# Patient Record
Sex: FEMALE | Race: WHITE | NOT HISPANIC OR LATINO | Employment: FULL TIME | URBAN - METROPOLITAN AREA
[De-identification: names, ages, dates, MRNs, and addresses within clinical notes are randomized per-mention and may not be internally consistent; named-entity substitution may affect disease eponyms.]

---

## 2017-01-28 VITALS
TEMPERATURE: 98.1 F | SYSTOLIC BLOOD PRESSURE: 129 MMHG | WEIGHT: 223.33 LBS | DIASTOLIC BLOOD PRESSURE: 67 MMHG | OXYGEN SATURATION: 99 % | BODY MASS INDEX: 31.27 KG/M2 | RESPIRATION RATE: 18 BRPM | HEIGHT: 71 IN | HEART RATE: 92 BPM

## 2017-01-28 PROCEDURE — 99283 EMERGENCY DEPT VISIT LOW MDM: CPT

## 2017-01-28 PROCEDURE — 303977 HCHG I & D

## 2017-01-28 RX ORDER — AMOXICILLIN 500 MG/1
1000 CAPSULE ORAL 2 TIMES DAILY
COMMUNITY
End: 2019-10-20

## 2017-01-28 ASSESSMENT — PAIN SCALES - GENERAL: PAINLEVEL_OUTOF10: 9

## 2017-01-29 ENCOUNTER — HOSPITAL ENCOUNTER (EMERGENCY)
Facility: MEDICAL CENTER | Age: 32
End: 2017-01-29
Attending: EMERGENCY MEDICINE
Payer: MEDICAID

## 2017-01-29 DIAGNOSIS — N61.1 BREAST ABSCESS OF FEMALE: ICD-10-CM

## 2017-01-29 PROCEDURE — A9270 NON-COVERED ITEM OR SERVICE: HCPCS | Performed by: EMERGENCY MEDICINE

## 2017-01-29 PROCEDURE — 700102 HCHG RX REV CODE 250 W/ 637 OVERRIDE(OP): Performed by: EMERGENCY MEDICINE

## 2017-01-29 PROCEDURE — 700101 HCHG RX REV CODE 250

## 2017-01-29 RX ORDER — CEPHALEXIN 500 MG/1
500 CAPSULE ORAL ONCE
Status: COMPLETED | OUTPATIENT
Start: 2017-01-29 | End: 2017-01-29

## 2017-01-29 RX ORDER — LORAZEPAM 1 MG/1
0.5 TABLET ORAL ONCE
Status: COMPLETED | OUTPATIENT
Start: 2017-01-29 | End: 2017-01-29

## 2017-01-29 RX ORDER — HYDROCODONE BITARTRATE AND ACETAMINOPHEN 5; 325 MG/1; MG/1
1-2 TABLET ORAL EVERY 6 HOURS PRN
Qty: 12 TAB | Refills: 0 | Status: SHIPPED | OUTPATIENT
Start: 2017-01-29 | End: 2019-10-20

## 2017-01-29 RX ORDER — SULFAMETHOXAZOLE AND TRIMETHOPRIM 800; 160 MG/1; MG/1
1 TABLET ORAL ONCE
Status: COMPLETED | OUTPATIENT
Start: 2017-01-29 | End: 2017-01-29

## 2017-01-29 RX ORDER — HYDROCODONE BITARTRATE AND ACETAMINOPHEN 5; 325 MG/1; MG/1
2 TABLET ORAL ONCE
Status: COMPLETED | OUTPATIENT
Start: 2017-01-29 | End: 2017-01-29

## 2017-01-29 RX ORDER — SULFAMETHOXAZOLE AND TRIMETHOPRIM 800; 160 MG/1; MG/1
1 TABLET ORAL EVERY 12 HOURS
Qty: 20 TAB | Refills: 0 | Status: SHIPPED | OUTPATIENT
Start: 2017-01-29 | End: 2017-02-08

## 2017-01-29 RX ADMIN — CEPHALEXIN 500 MG: 500 CAPSULE ORAL at 03:25

## 2017-01-29 RX ADMIN — LORAZEPAM 0.5 MG: 1 TABLET ORAL at 03:25

## 2017-01-29 RX ADMIN — SULFAMETHOXAZOLE AND TRIMETHOPRIM 1 TABLET: 800; 160 TABLET ORAL at 03:25

## 2017-01-29 RX ADMIN — LIDOCAINE HYDROCHLORIDE: 10; .005 INJECTION, SOLUTION EPIDURAL; INFILTRATION; INTRACAUDAL; PERINEURAL at 03:30

## 2017-01-29 RX ADMIN — HYDROCODONE BITARTRATE AND ACETAMINOPHEN 2 TABLET: 5; 325 TABLET ORAL at 03:25

## 2017-01-29 NOTE — ED AVS SNAPSHOT
1/29/2017          Janice Goins  [address] [place] [address]    Dear Janice:    [facility] wants to ensure your discharge home is safe and you or your loved ones have had all your questions answered regarding your care after you leave the hospital.    You may receive a telephone call within two days of your discharge.  This call is to make certain you understand your discharge instructions as well as ensure we provided you with the best care possible during your stay with us.     The call will only last approximately 3-5 minutes and will be done by a nurse.    Once again, we want to ensure your discharge home is safe and that you have a clear understanding of any next steps in your care.  If you have any questions or concerns, please do not hesitate to contact us, we are here for you.  Thank you for choosing [facility] for your healthcare needs.    Sincerely,    Shravan Francisco    [facility]

## 2017-01-29 NOTE — ED PROVIDER NOTES
"ED Provider Note    Scribed for Dave Silva M.D. by Topher Briones. 1/29/2017, 3:09 AM.    Primary care provider: Pcp Not In Computer  Means of arrival: Walk-in  History obtained from: Patient  History limited by: None    CHIEF COMPLAINT  Chief Complaint   Patient presents with   • Breast Pain   • Breast Swelling       HPI  Janice Goins is a 31 y.o. female who presents to the Emergency Department with breast pain and swelling for one week. Patient has a lump under her left nipple with redness and pain that she states developed overnight. She rates the pain as 7/10 on the pain as 7/10 on the pain scale. She stopped breastfeeding 4 months ago. Patient denies fever, chills, nausea, vomiting, or other symptoms. Patient has been taking amoxicillin for over one week.     REVIEW OF SYSTEMS  Review of Systems   Musculoskeletal:        Positive for breast pain and swelling   Skin:        Positive for redness   All other systems reviewed and are negative.      PAST MEDICAL HISTORY   Pregnancy with live birth    SURGICAL HISTORY  patient denies any surgical history    SOCIAL HISTORY  Social History   Substance Use Topics   • Smoking status: Never Smoker    • Smokeless tobacco: Never Used   • Alcohol Use: No      History   Drug Use No       FAMILY HISTORY  No contributing family history noted.     CURRENT MEDICATIONS  Home Medications     Reviewed by Efe Don R.N. (Registered Nurse) on 01/29/17 at 0251  Med List Status: Partial    Medication Last Dose Status    amoxicillin (AMOXIL) 500 MG Cap  Active                ALLERGIES  No Known Allergies    PHYSICAL EXAM  VITAL SIGNS: /67 mmHg  Pulse 92  Temp(Src) 36.7 °C (98.1 °F)  Resp 18  Ht 1.803 m (5' 10.98\")  Wt 101.3 kg (223 lb 5.2 oz)  BMI 31.16 kg/m2  SpO2 99%    Constitutional: Well developed, Well nourished, mild distress.   HENT: Normocephalic, Atraumatic  Eyes: Conjunctiva normal, No discharge.   Cardiovascular: Normal heart rate, Normal " rhythm, No murmurs, equal pulses.   Pulmonary: Normal breath sounds, No respiratory distress, No wheezing, No rales, No rhonchi.  Abdomen:Soft, No tenderness, No masses, no rebound, no guarding.   Chest, patient's left breast has a what appears to be an abscess as described below  Skin: Left breast 3:00 o'clock position just off left areola with 2 cm x 3 cm area of erythema with warmth, tenderness, and fluctuance, slightly pointing.  Neurologic: Alert & oriented x 3, Normal motor function,  No focal deficits noted.   Psychiatric: Affect normal, Judgment normal, Mood normal.     Incision and Drainage Procedure Note    Indication: Abscess    Procedure: The patient was positioned appropriately and the skin over the incision site was prepped with betadine and draped in a sterile fashion. Local anesthesia was obtained by infiltration using 2% Lidocaine with epinephrine.  An incision was then made over the apex of the lesion and approximately 3 cc of thick, foul smelling and purulent material was expressed. Loculations were not present. The drainage cavity was then irrigated, packed with sterile gauze and dressed with a sterile dressing. The patient’s tetanus status was up to date and did not require a booster dose.    The patient tolerated the procedure well.    Complications: None       COURSE & MEDICAL DECISION MAKING  Pertinent Labs & Imaging studies reviewed. (See chart for details)    3:09 AM - Patient seen and examined at bedside. Bedside ultrasound indicated an abscess. I explained to the patient I can drain the area, as antibiotics will not be as effective without draining the abscess. She agrees to this but is anxious. Patient will be treated with Ativan 0.5 mg PO, Bactrim 800-160 mg PO, Keflex 500 mg PO, Norco 5-325 mg PO    Medical Decision Making: Patient presents with what appears to be an abscess of the left breast. After discussion with the patient and given options of needle drainage versus incision and  drainage with a scalpel patient preferred the scalpel technique. Patient underwent procedure well and was placed on Bactrim to cover surrounding cellulitis. As the there are on amoxicillin as well.    I reviewed prescription monitoring program for patient's narcotic use before prescribing a scheduled drug.The patient will not drink alcohol nor drive with prescribed medications. The patient will return for new or worsening symptoms and is stable at the time of discharge.    The patient is referred to a primary physician for blood pressure management, diabetic screening, and for all other preventative health concerns.    DISPOSITION:  Patient will be discharged home in stable condition.    FOLLOW UP:  Your Physician  Varies    Schedule an appointment as soon as possible for a visit in 2 days      [place] [id number] [facility] [address]  [facility] [address]  [phone]      If you need a doctor    [address] [address]  [phone]    If you need a doctor      OUTPATIENT MEDICATIONS:  Discharge Medication List as of 1/29/2017  4:03 AM      START taking these medications    Details   sulfamethoxazole-trimethoprim (BACTRIM DS) 800-160 MG tablet Take 1 Tab by mouth every 12 hours for 10 days., Disp-20 Tab, R-0, Print Rx Paper      hydrocodone-acetaminophen (NORCO) 5-325 MG Tab per tablet Take 1-2 Tabs by mouth every 6 hours as needed., Disp-12 Tab, R-0, Print Rx Paper              FINAL IMPRESSION  1. Breast abscess of female          Topher SANDOVAL (Trey), am scribing for, and in the presence of, Dave Silva M.D.    Electronically signed by: Topher Frederick), 1/29/2017    Dave SANDOVAL M.D. personally performed the services described in this documentation, as scribed by Topher Briones in my presence, and it is both accurate and complete.    The note accurately reflects work and decisions made by me.  Dave Silva  1/29/2017  6:47 AM

## 2017-01-29 NOTE — ED NOTES
Patient to ED triage complaints of left breast pain and swelling. She as grape sized lump under left nipple, skin is discolored and redness is noted to area around lump. She states she noticed it  Week ago, she states it developed over night. Area is very tender. Rates pain 7/10. She states she stopped breast feeding approx 4 months ago. Denies fever.     Pt educated on ED process and asked to wait in lobby until appropriate bed assignment can be made. Patient educated on importance of alerting staff to new or worsening symptoms or concerns.

## 2017-01-29 NOTE — ED NOTES
Pt to be discharged following abscess incision and drainage by ERP. AVS and Rx given along with follow up instructions.

## 2017-01-29 NOTE — ED AVS SNAPSHOT
Social Point Access Code: [email or web address]  Expires: 2/28/2017  4:02 AM    Your email address is not on file at [facility].  Email Addresses are required for you to sign up for Social Point, please contact [phone] to verify your personal information and to provide your email address prior to attempting to register for Social Point.    Janice Goins  [address] [facility] [place] [address]    Social Point  A secure, online tool to manage your health information     [facility]’s Social Point® is a secure, online tool that connects you to your personalized health information from the privacy of your home -- day or night - making it very easy for you to manage your healthcare. Once the activation process is completed, you can even access your medical information using the Social Point nuria, which is available for free in the Apple Nuria store or Google Play store.     To learn more about Social Point, visit [email or web address]    There are two levels of access available (as shown below):   My Chart Features  [facility] Primary Care Doctor [facility]  Specialists [facility]  Urgent  Care Non-[facility] Primary Care Doctor   Email your healthcare team securely and privately 24/7 X X X    Manage appointments: schedule your next appointment; view details of past/upcoming appointments X      Request prescription refills. X      View recent personal medical records, including lab and immunizations X X X X   View health record, including health history, allergies, medications X X X X   Read reports about your outpatient visits, procedures, consult and ER notes X X X X   See your discharge summary, which is a recap of your hospital and/or ER visit that includes your diagnosis, lab results, and care plan X X  X     How to register for Social Point:  Once your e-mail address has been verified, follow the following steps to sign up for Social Point.     1. Go to  [email or web address]  2. Click on the Sign Up Now box, which takes you to the New Member Sign Up page. You will  need to provide the following information:  a. Enter your Naytev Access Code exactly as it appears at the top of this page. (You will not need to use this code after you’ve completed the sign-up process. If you do not sign up before the expiration date, you must request a new code.)   b. Enter your date of birth.   c. Enter your home email address.   d. Click Submit, and follow the next screen’s instructions.  3. Create a Robodromt ID. This will be your Naytev login ID and cannot be changed, so think of one that is secure and easy to remember.  4. Create a Naytev password. You can change your password at any time.  5. Enter your Password Reset Question and Answer. This can be used at a later time if you forget your password.   6. Enter your e-mail address. This allows you to receive e-mail notifications when new information is available in Naytev.  7. Click Sign Up. You can now view your health information.    For assistance activating your Naytev account, call [phone]

## 2017-01-29 NOTE — ED AVS SNAPSHOT
After Visit Summary                                                                                                                Janice Goins   MRN: [id number]    Department:  [facility], Emergency Dept   Date of Visit:  1/28/2017            [facility], Emergency Dept    [address] [address]    Phone:  [phone]      You were seen by     Dave Silva M.D.      Your Diagnosis Was     Breast abscess of female     N61.1       These are the medications you received during your hospitalization from 01/28/2017 2234 to 01/29/2017 0403     Date/Time Order Dose Route Action    01/29/2017 0325 hydrocodone-acetaminophen (NORCO) 5-325 MG per tablet 2 Tab 2 Tab Oral Given    01/29/2017 0325 lorazepam (ATIVAN) tablet 0.5 mg 0.5 mg Oral Given    01/29/2017 0325 sulfamethoxazole-trimethoprim (BACTRIM DS) 800-160 MG tablet 1 Tab 1 Tab Oral Given    01/29/2017 0325 cephALEXin (KEFLEX) capsule 500 mg 500 mg Oral Given      Follow-up Information     1. Follow up with Your Physician. Schedule an appointment as soon as possible for a visit in 2 days.    Specialty:  Emergency Medicine    Contact information    Varies          2. Follow up with [facility].    Why:  If you need a doctor    Contact information    [address]  [facility] [address] [phone]          3. Follow up with [facility].    Why:  If you need a doctor    Contact information    [address] [address] [phone]      Medication Information     Review all of your home medications and newly ordered medications with your primary doctor and/or pharmacist as soon as possible. Follow medication instructions as directed by your doctor and/or pharmacist.     Please keep your complete medication list with you and share with your physician. Update the information when medications are discontinued, doses are changed, or new medications (including over-the-counter  products) are added; and carry medication information at all times in the event of emergency situations.               Medication List      START taking these medications        Instructions    hydrocodone-acetaminophen 5-325 MG Tabs per tablet   Commonly known as:  NORCO    Take 1-2 Tabs by mouth every 6 hours as needed.   Dose:  1-2 Tab       sulfamethoxazole-trimethoprim 800-160 MG tablet   Commonly known as:  BACTRIM DS    Take 1 Tab by mouth every 12 hours for 10 days.   Dose:  1 Tab         ASK your doctor about these medications        Instructions    amoxicillin 500 MG Caps   Commonly known as:  AMOXIL    Take 1,000 mg by mouth 2 times a day.   Dose:  1000 mg                 Discharge Instructions       Return if you have increasing pain, redness, fever or significant pus. Return or see your doctor in 2 days for packing removal and recheck. Take all your antibiotics until gone. No drinking or driving on [place]. Wash daily with soap and water and keep covered.     Abscess  An abscess is an infected area that contains a collection of pus and debris. It can occur in almost any part of the body. An abscess is also known as a furuncle or boil.  CAUSES   An abscess occurs when tissue gets infected. This can occur from blockage of oil or sweat glands, infection of hair follicles, or a minor injury to the skin. As the body tries to fight the infection, pus collects in the area and creates pressure under the skin. This pressure causes pain. People with weakened immune systems have difficulty fighting infections and get certain abscesses more often.   SYMPTOMS  Usually an abscess develops on the skin and becomes a painful mass that is red, warm, and tender. If the abscess forms under the skin, you may feel a moveable soft area under the skin. Some abscesses break open (rupture) on their own, but most will continue to get worse without care. The infection can spread deeper into the body and eventually into the  bloodstream, causing you to feel ill.   DIAGNOSIS   Your caregiver will take your medical history and perform a physical exam. A sample of fluid may also be taken from the abscess to determine what is causing your infection.  TREATMENT   Your caregiver may prescribe antibiotic medicines to fight the infection. However, taking antibiotics alone usually does not cure an abscess. Your caregiver may need to make a small cut (incision) in the abscess to drain the pus. In some cases, gauze is packed into the abscess to reduce pain and to continue draining the area.  HOME CARE INSTRUCTIONS   · Only take over-the-counter or prescription medicines for pain, discomfort, or fever as directed by your caregiver.  · If you were prescribed antibiotics, take them as directed. Finish them even if you start to feel better.  · If gauze is used, follow your caregiver's directions for changing the gauze.  · To avoid spreading the infection:  ¨ Keep your draining abscess covered with a bandage.  ¨ Wash your hands well.  ¨ Do not share personal care items, towels, or whirlpools with others.  ¨ Avoid skin contact with others.  · Keep your skin and clothes clean around the abscess.  · Keep all follow-up appointments as directed by your caregiver.  SEEK MEDICAL CARE IF:   · You have increased pain, swelling, redness, fluid drainage, or bleeding.  · You have muscle aches, chills, or a general ill feeling.  · You have a fever.  MAKE SURE YOU:   · Understand these instructions.  · Will watch your condition.  · Will get help right away if you are not doing well or get worse.     This information is not intended to replace advice given to you by your health care provider. Make sure you discuss any questions you have with your health care provider.     Document Released: 09/27/2006 Document Revised: 06/18/2013 Document Reviewed: 03/01/2013  [employer] Interactive Patient Education ©2016 [employer]            Patient Information     Patient  Information    Following emergency treatment: all patient requiring follow-up care must return either to a private physician or a clinic if your condition worsens before you are able to obtain further medical attention, please return to the emergency room.     Billing Information    At [facility], we work to make the billing process streamlined for our patients.  Our Representatives are here to answer any questions you may have regarding your hospital bill.  If you have insurance coverage and have supplied your insurance information to us, we will submit a claim to your insurer on your behalf.  Should you have any questions regarding your bill, we can be reached online or by phone as follows:  Online: You are able pay your bills online or live chat with our representatives about any billing questions you may have. We are here to help Monday - Friday from 8:00am to 7:30pm and 9:00am - 12:00pm on Saturdays.  Please visit [email or web address]  for more information.   Phone:  [phone] or [phone]    Please note that your emergency physician, surgeon, pathologist, radiologist, anesthesiologist, and other specialists are not employed by [facility] and will therefore bill separately for their services.  Please contact them directly for any questions concerning their bills at the numbers below:     Emergency Physician Services:  [phone]  [facility]:  [phone]  [facility]:  [phone]  [facility]:  [phone]    1. Your final bill may vary from the amount quoted upon discharge if all procedures are not complete at that time, or if your doctor has additional procedures of which we are not aware. You will receive an additional bill if you return to the Emergency Department at [facility] for suture removal regardless of the facility of which the sutures were placed.     2. Please arrange for settlement of this account  at the emergency registration.    3. All self-pay accounts are due in full at the time of treatment.  If you are unable to meet this obligation then payment is expected within 4-5 days.     4. If you have had radiology studies (CT, X-ray, Ultrasound, MRI), you have received a preliminary result during your emergency department visit. Please contact the radiology department [phone] to receive a copy of your final result. Please discuss the Final result with your primary physician or with the follow up physician provided.     Crisis Hotline:  [place] Crisis Hotline:  9-034-UDFAXQU or 1-681.732.7266  [place] Crisis Hotline:    1-346.972.5002 or 740-530-0988         ED Discharge Follow Up Questions    1. In order to provide you with very good care, we would like to follow up with a phone call in the next few days.  May we have your permission to contact you?     YES /  NO    2. What is the best phone number to call you? (       )_____-__________    3. What is the best time to call you?      Morning  /  Afternoon  /  Evening                   Patient Signature:  ____________________________________________________________    Date:  ____________________________________________________________

## 2017-01-29 NOTE — DISCHARGE INSTRUCTIONS
Return if you have increasing pain, redness, fever or significant pus. Return or see your doctor in 2 days for packing removal and recheck. Take all your antibiotics until gone. No drinking or driving on [place]. Wash daily with soap and water and keep covered.     Abscess  An abscess is an infected area that contains a collection of pus and debris. It can occur in almost any part of the body. An abscess is also known as a furuncle or boil.  CAUSES   An abscess occurs when tissue gets infected. This can occur from blockage of oil or sweat glands, infection of hair follicles, or a minor injury to the skin. As the body tries to fight the infection, pus collects in the area and creates pressure under the skin. This pressure causes pain. People with weakened immune systems have difficulty fighting infections and get certain abscesses more often.   SYMPTOMS  Usually an abscess develops on the skin and becomes a painful mass that is red, warm, and tender. If the abscess forms under the skin, you may feel a moveable soft area under the skin. Some abscesses break open (rupture) on their own, but most will continue to get worse without care. The infection can spread deeper into the body and eventually into the bloodstream, causing you to feel ill.   DIAGNOSIS   Your caregiver will take your medical history and perform a physical exam. A sample of fluid may also be taken from the abscess to determine what is causing your infection.  TREATMENT   Your caregiver may prescribe antibiotic medicines to fight the infection. However, taking antibiotics alone usually does not cure an abscess. Your caregiver may need to make a small cut (incision) in the abscess to drain the pus. In some cases, gauze is packed into the abscess to reduce pain and to continue draining the area.  HOME CARE INSTRUCTIONS   · Only take over-the-counter or prescription medicines for pain, discomfort, or fever as directed by your caregiver.  · If you were  prescribed antibiotics, take them as directed. Finish them even if you start to feel better.  · If gauze is used, follow your caregiver's directions for changing the gauze.  · To avoid spreading the infection:  ¨ Keep your draining abscess covered with a bandage.  ¨ Wash your hands well.  ¨ Do not share personal care items, towels, or whirlpools with others.  ¨ Avoid skin contact with others.  · Keep your skin and clothes clean around the abscess.  · Keep all follow-up appointments as directed by your caregiver.  SEEK MEDICAL CARE IF:   · You have increased pain, swelling, redness, fluid drainage, or bleeding.  · You have muscle aches, chills, or a general ill feeling.  · You have a fever.  MAKE SURE YOU:   · Understand these instructions.  · Will watch your condition.  · Will get help right away if you are not doing well or get worse.     This information is not intended to replace advice given to you by your health care provider. Make sure you discuss any questions you have with your health care provider.     Document Released: 09/27/2006 Document Revised: 06/18/2013 Document Reviewed: 03/01/2013  [employer] Interactive Patient Education ©2016 [employer]

## 2017-01-29 NOTE — ED NOTES
Discharge orders received, instructions and education given, follow-up appointments discussed, pt verbalized understanding.

## 2017-02-22 ENCOUNTER — NON-PROVIDER VISIT (OUTPATIENT)
Dept: URGENT CARE | Facility: CLINIC | Age: 32
End: 2017-02-22

## 2017-02-22 DIAGNOSIS — Z02.1 PRE-EMPLOYMENT DRUG SCREENING: ICD-10-CM

## 2017-02-22 LAB
AMP AMPHETAMINE: NORMAL
COC COCAINE: NORMAL
INT CON NEG: NORMAL
INT CON POS: NORMAL
MET METHAMPHETAMINES: NORMAL
OPI OPIATES: NORMAL
PCP PHENCYCLIDINE: NORMAL
POC DRUG COMMENT 753798-OCCUPATIONAL HEALTH: NORMAL
THC: NORMAL

## 2017-02-22 PROCEDURE — 80305 DRUG TEST PRSMV DIR OPT OBS: CPT | Performed by: NURSE PRACTITIONER

## 2017-05-05 ENCOUNTER — HOSPITAL ENCOUNTER (EMERGENCY)
Dept: HOSPITAL 8 - ED | Age: 32
Discharge: HOME | End: 2017-05-05
Payer: MEDICAID

## 2017-05-05 VITALS — WEIGHT: 234.57 LBS | BODY MASS INDEX: 32.84 KG/M2 | HEIGHT: 71 IN

## 2017-05-05 VITALS — DIASTOLIC BLOOD PRESSURE: 74 MMHG | SYSTOLIC BLOOD PRESSURE: 119 MMHG

## 2017-05-05 DIAGNOSIS — H10.023: Primary | ICD-10-CM

## 2017-05-05 PROCEDURE — 99283 EMERGENCY DEPT VISIT LOW MDM: CPT

## 2019-10-20 ENCOUNTER — HOSPITAL ENCOUNTER (EMERGENCY)
Facility: MEDICAL CENTER | Age: 34
End: 2019-10-20
Attending: EMERGENCY MEDICINE
Payer: MEDICAID

## 2019-10-20 VITALS
TEMPERATURE: 98 F | BODY MASS INDEX: 41.02 KG/M2 | SYSTOLIC BLOOD PRESSURE: 114 MMHG | OXYGEN SATURATION: 99 % | HEIGHT: 71 IN | DIASTOLIC BLOOD PRESSURE: 61 MMHG | RESPIRATION RATE: 16 BRPM | WEIGHT: 293 LBS | HEART RATE: 81 BPM

## 2019-10-20 DIAGNOSIS — K02.9 PAIN DUE TO DENTAL CARIES: ICD-10-CM

## 2019-10-20 PROCEDURE — 700102 HCHG RX REV CODE 250 W/ 637 OVERRIDE(OP): Performed by: EMERGENCY MEDICINE

## 2019-10-20 PROCEDURE — 99283 EMERGENCY DEPT VISIT LOW MDM: CPT

## 2019-10-20 PROCEDURE — A9270 NON-COVERED ITEM OR SERVICE: HCPCS | Performed by: EMERGENCY MEDICINE

## 2019-10-20 RX ORDER — HYDROCODONE BITARTRATE AND ACETAMINOPHEN 5; 325 MG/1; MG/1
1 TABLET ORAL ONCE
Status: COMPLETED | OUTPATIENT
Start: 2019-10-20 | End: 2019-10-20

## 2019-10-20 RX ORDER — ACETAMINOPHEN 500 MG
500-1000 TABLET ORAL EVERY 6 HOURS PRN
Status: ON HOLD | COMMUNITY
End: 2020-06-06

## 2019-10-20 RX ORDER — IBUPROFEN 600 MG/1
600 TABLET ORAL EVERY 6 HOURS PRN
Status: ON HOLD | COMMUNITY
End: 2020-01-29

## 2019-10-20 RX ORDER — AMOXICILLIN AND CLAVULANATE POTASSIUM 875; 125 MG/1; MG/1
1 TABLET, FILM COATED ORAL ONCE
Status: COMPLETED | OUTPATIENT
Start: 2019-10-20 | End: 2019-10-20

## 2019-10-20 RX ORDER — AMOXICILLIN AND CLAVULANATE POTASSIUM 875; 125 MG/1; MG/1
1 TABLET, FILM COATED ORAL 2 TIMES DAILY
Qty: 20 TAB | Refills: 0 | Status: SHIPPED | OUTPATIENT
Start: 2019-10-20 | End: 2019-10-30

## 2019-10-20 RX ORDER — HYDROCODONE BITARTRATE AND ACETAMINOPHEN 5; 325 MG/1; MG/1
1 TABLET ORAL EVERY 6 HOURS PRN
Qty: 12 TAB | Refills: 0 | Status: SHIPPED | OUTPATIENT
Start: 2019-10-20 | End: 2019-10-23

## 2019-10-20 RX ADMIN — HYDROCODONE BITARTRATE AND ACETAMINOPHEN 1 TABLET: 5; 325 TABLET ORAL at 20:44

## 2019-10-20 RX ADMIN — AMOXICILLIN AND CLAVULANATE POTASSIUM 1 TABLET: 875; 125 TABLET, FILM COATED ORAL at 20:44

## 2019-10-20 ASSESSMENT — LIFESTYLE VARIABLES: DO YOU DRINK ALCOHOL: NO

## 2019-10-21 NOTE — DISCHARGE INSTRUCTIONS
Dental Care and Dentist Visits  Dental care supports good overall health. Regular dental visits can also help you avoid dental pain, bleeding, infection, and other more serious health problems in the future. It is important to keep the mouth healthy because diseases in the teeth, gums, and other oral tissues can spread to other areas of the body. Some problems, such as diabetes, heart disease, and pre-term labor have been associated with poor oral health.   See your dentist every 6 months. If you experience emergency problems such as a toothache or broken tooth, go to the dentist right away. If you see your dentist regularly, you may catch problems early. It is easier to be treated for problems in the early stages.   WHAT TO EXPECT AT A DENTIST VISIT   Your dentist will look for many common oral health problems and recommend proper treatment. At your regular dental visit, you can expect:  · Gentle cleaning of the teeth and gums. This includes scraping and polishing. This helps to remove the sticky substance around the teeth and gums (plaque). Plaque forms in the mouth shortly after eating. Over time, plaque hardens on the teeth as tartar. If tartar is not removed regularly, it can cause problems. Cleaning also helps remove stains.  · Periodic X-rays. These pictures of the teeth and supporting bone will help your dentist assess the health of your teeth.  · Periodic fluoride treatments. Fluoride is a natural mineral shown to help strengthen teeth. Fluoride treatment involves applying a fluoride gel or varnish to the teeth. It is most commonly done in children.  · Examination of the mouth, tongue, jaws, teeth, and gums to look for any oral health problems, such as:  ¨ Cavities (dental caries). This is decay on the tooth caused by plaque, sugar, and acid in the mouth. It is best to catch a cavity when it is small.  ¨ Inflammation of the gums caused by plaque buildup (gingivitis).  ¨ Problems with the mouth or  malformed or misaligned teeth.  ¨ Oral cancer or other diseases of the soft tissues or jaws.   KEEP YOUR TEETH AND GUMS HEALTHY  For healthy teeth and gums, follow these general guidelines as well as your dentist's specific advice:  · Have your teeth professionally cleaned at the dentist every 6 months.  · Brush twice daily with a fluoride toothpaste.  · Floss your teeth daily.   · Ask your dentist if you need fluoride supplements, treatments, or fluoride toothpaste.  · Eat a healthy diet. Reduce foods and drinks with added sugar.  · Avoid smoking.  TREATMENT FOR ORAL HEALTH PROBLEMS  If you have oral health problems, treatment varies depending on the conditions present in your teeth and gums.  · Your caregiver will most likely recommend good oral hygiene at each visit.  · For cavities, gingivitis, or other oral health disease, your caregiver will perform a procedure to treat the problem. This is typically done at a separate appointment. Sometimes your caregiver will refer you to another dental specialist for specific tooth problems or for surgery.  SEEK IMMEDIATE DENTAL CARE IF:  · You have pain, bleeding, or soreness in the gum, tooth, jaw, or mouth area.  · A permanent tooth becomes loose or  from the gum socket.  · You experience a blow or injury to the mouth or jaw area.     This information is not intended to replace advice given to you by your health care provider. Make sure you discuss any questions you have with your health care provider.     Document Released: 08/29/2012 Document Revised: 03/11/2013 Document Reviewed: 08/29/2012  [employer] Interactive Patient Education ©2016 [employer]  Follow-up at the [facility] dental office as soon as possible.  The emergency department will not provide refills of controlled substances.

## 2019-10-21 NOTE — ED NOTES
D/c papers given, pt verbalized understanding. Resp even ul, skin warm and dry, AAOx4, no acute distress noted.  Prescriptions given, narcotic form signed, verbalized understanding.  Pt ambulates with steady gait to ED exit, no acute distress noted.

## 2019-10-21 NOTE — ED TRIAGE NOTES
Janice Goins  34 y.o.  Chief Complaint   Patient presents with   • Dental Injury     R upper and lower molars chipped about 10 days ago   • Dental Pain     rates pain 10/10, unrelieved wiht Tylenol and Ibuprofen taken at home   • Pregnancy     5 weeks, LMP 9/14/19     Ambulatory to triage with steady gait for above.    Recently moved back to [place] - is not yet established with a PCP or a dentist.    Airway intact. No stridor noted. Managing secretions and speaking in full sentences without difficulty.    Triage process explained to patient, apologized for wait time, and returned to lobby.

## 2019-10-21 NOTE — ED PROVIDER NOTES
ED Provider Note    CHIEF COMPLAINT  Chief Complaint   Patient presents with   • Dental Injury     R upper and lower molars chipped about 10 days ago   • Dental Pain     rates pain 10/10, unrelieved wiht Tylenol and Ibuprofen taken at home   • Pregnancy     5 weeks, LMP 9/14/19       HPI  Janice Goins is a 34 y.o. female who presents to the emergency department complaining of a week and a half of dental pain.  The patient chipped a carious tooth in the right lower dentition and also has pain in the molar on the right upper dentition.  She has not yet seen a dentist stating that she does not have insurance or money to see a dentist but she just learned that she could go to the [facility] dental clinic.  The patient is currently 5 weeks pregnant she also has not yet had any prenatal care but plans to go to the pregnancy center.  The patient says that she typically does not take pain pills but Tylenol and Motrin are not controlling her pain so she is come in today.    REVIEW OF SYSTEMS no fever or chills    PAST MEDICAL HISTORY  History reviewed. No pertinent past medical history.    FAMILY HISTORY  History reviewed. No pertinent family history.    SOCIAL HISTORY  Social History     Socioeconomic History   • Marital status: Single     Spouse name: Not on file   • Number of children: Not on file   • Years of education: Not on file   • Highest education level: Not on file   Occupational History   • Not on file   Social Needs   • Financial resource strain: Not on file   • Food insecurity:     Worry: Not on file     Inability: Not on file   • Transportation needs:     Medical: Not on file     Non-medical: Not on file   Tobacco Use   • Smoking status: Never Smoker   • Smokeless tobacco: Never Used   Substance and Sexual Activity   • Alcohol use: No   • Drug use: No   • Sexual activity: Not Currently     Partners: Male     Birth control/protection: Pill   Lifestyle   • Physical activity:     Days per week: Not on file      "Minutes per session: Not on file   • Stress: Not on file   Relationships   • Social connections:     Talks on phone: Not on file     Gets together: Not on file     Attends [employer] service: Not on file     Active member of club or organization: Not on file     Attends meetings of clubs or organizations: Not on file     Relationship status: Not on file   • Intimate partner violence:     Fear of current or ex partner: Not on file     Emotionally abused: Not on file     Physically abused: Not on file     Forced sexual activity: Not on file   Other Topics Concern   • Not on file   Social History Narrative   • Not on file       SURGICAL HISTORY  History reviewed. No pertinent surgical history.    CURRENT MEDICATIONS  Home Medications     Reviewed by Cassia Hi R.N. (Registered Nurse) on 10/20/19 at 2007  Med List Status: Complete   Medication Last Dose Status   acetaminophen (TYLENOL) 500 MG Tab  Active   ibuprofen (MOTRIN) 600 MG Tab  Active                ALLERGIES  No Known Allergies    PHYSICAL EXAM  VITAL SIGNS: /72   Pulse 90   Temp 36.4 °C (97.5 °F) (Temporal)   Resp 18   Ht 1.803 m (5' 11\")   Wt (!) 135.1 kg (297 lb 13.5 oz)   LMP 09/14/2019 (Exact Date)   SpO2 97%   BMI 41.54 kg/m²    Oxygen saturation is interpreted as adequate  Constitutional: Awake uncomfortable but otherwise well-appearing  HENT: There are chronic appearing dental caries in the right upper and lower dentition and a fractured right lower molar there is no surrounding erythema or purulent collection I can drain in the emergency department throat is clear there is no facial erythema or swelling    CHART REVIEW  There are no entries in the [facility] prescription monitoring website for this patient    MEDICAL DECISION MAKING and DISPOSITION  In the emergency department the patient is started on Augmentin and given a dose of Norco.  I discussed the use of narcotics with her and she understands that the should be used " sparingly especially during pregnancy and we will not refill the narcotic prescriptions from the emergency department.  I have instructed the patient to call the [facility] dental clinic first thing Monday morning and arrange office recheck during the week    In prescribing controlled substances to this patient, I certify that I have obtained and reviewed the medical history of Janice Goins. I have also made a good norma effort to obtain applicable records from other providers who have treated the patient and records did not demonstrate any increased risk of substance abuse that would prevent me from prescribing controlled substances.     I have conducted a physical exam and documented it. I have reviewed Ms. Goins’s prescription history as maintained by the [place] Prescription Monitoring Program.     I have assessed the patient’s risk for abuse, dependency, and addiction using the validated Opioid Risk Tool available at https://www.mdcalc.com/cvigzj-vdjj-dghr-ort-narcotic-abuse.     Given the above, I believe the benefits of controlled substance therapy outweigh the risks. The reasons for prescribing controlled substances include non-narcotic, oral analgesic alternatives have been inadequate for pain control. Accordingly, I have discussed the risk and benefits, treatment plan, and alternative therapies with the patient.       IMPRESSION  1.  Dental pain due to dental caries         Electronically signed by: Gustavo Colvin, 10/20/2019 8:29 PM

## 2020-01-16 ENCOUNTER — APPOINTMENT (OUTPATIENT)
Dept: RADIOLOGY | Facility: MEDICAL CENTER | Age: 35
End: 2020-01-16
Attending: EMERGENCY MEDICINE

## 2020-01-16 ENCOUNTER — HOSPITAL ENCOUNTER (EMERGENCY)
Facility: MEDICAL CENTER | Age: 35
End: 2020-01-17
Attending: EMERGENCY MEDICINE

## 2020-01-16 VITALS
TEMPERATURE: 97 F | BODY MASS INDEX: 41.02 KG/M2 | DIASTOLIC BLOOD PRESSURE: 62 MMHG | SYSTOLIC BLOOD PRESSURE: 134 MMHG | OXYGEN SATURATION: 100 % | HEART RATE: 77 BPM | WEIGHT: 293 LBS | HEIGHT: 71 IN | RESPIRATION RATE: 17 BRPM

## 2020-01-16 DIAGNOSIS — S82.892A CLOSED FRACTURE OF LEFT ANKLE, INITIAL ENCOUNTER: ICD-10-CM

## 2020-01-16 PROCEDURE — 700102 HCHG RX REV CODE 250 W/ 637 OVERRIDE(OP): Performed by: EMERGENCY MEDICINE

## 2020-01-16 PROCEDURE — 302874 HCHG BANDAGE ACE 2 OR 3""

## 2020-01-16 PROCEDURE — 73600 X-RAY EXAM OF ANKLE: CPT | Mod: LT

## 2020-01-16 PROCEDURE — 96374 THER/PROPH/DIAG INJ IV PUSH: CPT | Mod: XU

## 2020-01-16 PROCEDURE — 700111 HCHG RX REV CODE 636 W/ 250 OVERRIDE (IP): Performed by: EMERGENCY MEDICINE

## 2020-01-16 PROCEDURE — 27840 TREAT ANKLE DISLOCATION: CPT

## 2020-01-16 PROCEDURE — 99285 EMERGENCY DEPT VISIT HI MDM: CPT

## 2020-01-16 PROCEDURE — 73610 X-RAY EXAM OF ANKLE: CPT | Mod: LT

## 2020-01-16 PROCEDURE — 99152 MOD SED SAME PHYS/QHP 5/>YRS: CPT

## 2020-01-16 PROCEDURE — 99153 MOD SED SAME PHYS/QHP EA: CPT

## 2020-01-16 PROCEDURE — A9270 NON-COVERED ITEM OR SERVICE: HCPCS | Performed by: EMERGENCY MEDICINE

## 2020-01-16 PROCEDURE — 302875 HCHG BANDAGE ACE 4 OR 6""

## 2020-01-16 RX ORDER — HYDROMORPHONE HYDROCHLORIDE 1 MG/ML
0.5 INJECTION, SOLUTION INTRAMUSCULAR; INTRAVENOUS; SUBCUTANEOUS ONCE
Status: COMPLETED | OUTPATIENT
Start: 2020-01-16 | End: 2020-01-16

## 2020-01-16 RX ORDER — OXYCODONE HYDROCHLORIDE AND ACETAMINOPHEN 5; 325 MG/1; MG/1
1 TABLET ORAL EVERY 4 HOURS PRN
Qty: 9 TAB | Refills: 0 | Status: SHIPPED | OUTPATIENT
Start: 2020-01-16 | End: 2020-01-19

## 2020-01-16 RX ORDER — OXYCODONE HYDROCHLORIDE AND ACETAMINOPHEN 5; 325 MG/1; MG/1
1 TABLET ORAL ONCE
Status: COMPLETED | OUTPATIENT
Start: 2020-01-16 | End: 2020-01-16

## 2020-01-16 RX ORDER — PROPOFOL 10 MG/ML
INJECTION, EMULSION INTRAVENOUS
Status: COMPLETED | OUTPATIENT
Start: 2020-01-16 | End: 2020-01-16

## 2020-01-16 RX ADMIN — PROPOFOL 20 MG: 10 INJECTION, EMULSION INTRAVENOUS at 22:16

## 2020-01-16 RX ADMIN — PROPOFOL 10 MG: 10 INJECTION, EMULSION INTRAVENOUS at 22:11

## 2020-01-16 RX ADMIN — PROPOFOL 20 MG: 10 INJECTION, EMULSION INTRAVENOUS at 22:17

## 2020-01-16 RX ADMIN — PROPOFOL 10 MG: 10 INJECTION, EMULSION INTRAVENOUS at 22:10

## 2020-01-16 RX ADMIN — HYDROMORPHONE HYDROCHLORIDE 0.5 MG: 1 INJECTION, SOLUTION INTRAMUSCULAR; INTRAVENOUS; SUBCUTANEOUS at 20:07

## 2020-01-16 RX ADMIN — PROPOFOL 10 MG: 10 INJECTION, EMULSION INTRAVENOUS at 22:23

## 2020-01-16 RX ADMIN — PROPOFOL 30 MG: 10 INJECTION, EMULSION INTRAVENOUS at 22:20

## 2020-01-16 RX ADMIN — PROPOFOL 20 MG: 10 INJECTION, EMULSION INTRAVENOUS at 22:09

## 2020-01-16 RX ADMIN — PROPOFOL 20 MG: 10 INJECTION, EMULSION INTRAVENOUS at 22:14

## 2020-01-16 RX ADMIN — PROPOFOL 20 MG: 10 INJECTION, EMULSION INTRAVENOUS at 22:08

## 2020-01-16 RX ADMIN — PROPOFOL 20 MG: 10 INJECTION, EMULSION INTRAVENOUS at 22:19

## 2020-01-16 RX ADMIN — PROPOFOL 10 MG: 10 INJECTION, EMULSION INTRAVENOUS at 22:24

## 2020-01-16 RX ADMIN — PROPOFOL 20 MG: 10 INJECTION, EMULSION INTRAVENOUS at 22:18

## 2020-01-16 RX ADMIN — PROPOFOL 10 MG: 10 INJECTION, EMULSION INTRAVENOUS at 22:12

## 2020-01-16 RX ADMIN — PROPOFOL 10 MG: 10 INJECTION, EMULSION INTRAVENOUS at 22:15

## 2020-01-16 RX ADMIN — PROPOFOL 20 MG: 10 INJECTION, EMULSION INTRAVENOUS at 22:13

## 2020-01-16 RX ADMIN — PROPOFOL 10 MG: 10 INJECTION, EMULSION INTRAVENOUS at 22:28

## 2020-01-16 RX ADMIN — OXYCODONE HYDROCHLORIDE AND ACETAMINOPHEN 1 TABLET: 5; 325 TABLET ORAL at 19:13

## 2020-01-16 NOTE — LETTER
Emergency Services     January 16, 2020    Patient: Janice Goins   YOB: 1985   Date of Visit: 1/16/2020       To Whom It May Concern:    Janice Goins was seen and treated in our emergency department on 1/16/2020. She may return to work 01/20/2020 .    Sincerely,     RAI DU M.D.  [facility], EMERGENCY DEPT  Dept: [phone]

## 2020-01-17 PROCEDURE — A9270 NON-COVERED ITEM OR SERVICE: HCPCS | Performed by: EMERGENCY MEDICINE

## 2020-01-17 PROCEDURE — 700102 HCHG RX REV CODE 250 W/ 637 OVERRIDE(OP): Performed by: EMERGENCY MEDICINE

## 2020-01-17 RX ORDER — OXYCODONE HYDROCHLORIDE AND ACETAMINOPHEN 5; 325 MG/1; MG/1
1 TABLET ORAL ONCE
Status: COMPLETED | OUTPATIENT
Start: 2020-01-17 | End: 2020-01-17

## 2020-01-17 RX ADMIN — OXYCODONE HYDROCHLORIDE AND ACETAMINOPHEN 1 TABLET: 5; 325 TABLET ORAL at 00:19

## 2020-01-17 NOTE — ED TRIAGE NOTES
Pt bib ems following a GLF with swelling now noted to L ankle, and severe 10/10 pain.   CMS intact  100 Fent given IM.

## 2020-01-17 NOTE — ED PROVIDER NOTES
"ED Provider Note    Chief Complaint:   Left ankle injury    HPI:  Janice Goins is a 34 y.o. female who presents with chief complaint of left ankle injury.  She was walking across a wet floor immediately prior to arrival when she slipped rolling her ankle.  She is uncertain as to exactly how she rolled her ankle and fell on her ankle.  She then felt an acute severe pain localized to the ankle with some associated swelling most pronounced along the medial aspect of the ankle.  She has not been able to bear weight, activated paramedics for transport to the emergency department for further evaluation.  Pain is exacerbated by movement, was alleviated by fentanyl given in the prehospital setting.  She reports no other injuries, no associated left knee pain nor left hip pain.  She has no history of impaired immunity, no history of poor circulation or peripheral vascular disease, is not currently on any medications including anticoagulation or antiplatelet agents.    Review of Systems:  See HPI for pertinent positives and negatives. All other systems negative.    Past Medical History:       Social History:  Social History     Tobacco Use   • Smoking status: Never Smoker   • Smokeless tobacco: Never Used   Substance and Sexual Activity   • Alcohol use: No   • Drug use: No   • Sexual activity: Not Currently     Partners: Male     Birth control/protection: Pill       Surgical History:  patient denies any surgical history    Current Medications:  Home Medications     Reviewed by Schuyler B Collett, R.N. (Registered Nurse) on 01/16/20 at 1912  Med List Status: <None>   Medication Last Dose Status   acetaminophen (TYLENOL) 500 MG Tab  Active   ibuprofen (MOTRIN) 600 MG Tab  Active                Allergies:  No Known Allergies    Physical Exam:  Vital Signs: /62   Pulse 77   Temp 36.1 °C (97 °F) (Temporal)   Resp 17   Ht 1.803 m (5' 11\")   Wt (!) 136.1 kg (300 lb)   LMP  (LMP Unknown)   SpO2 100%   " Breastfeeding? No   BMI 41.84 kg/m²   Constitutional: Alert, moderate discomfort  HENT: Atraumatic, normocephalic  Eyes: Pupils equal and reactive, normal conjunctiva  Neck: Supple, normal range of motion, no stridor  Cardiovascular: Extremities are warm and well perfused, left foot with 2+ DP pulse and normal capillary refill time  Pulmonary: No respiratory distress, normal work of breathing  Abdomen: Soft, non-distended  Skin: Warm, dry, small bruise overlying the medial aspect of the left ankle, no lacerations to the left lower extremity  Musculoskeletal: Left ankle with significant swelling and possible deformity, moderate soft tissue tenderness to palpation, due to swelling unable to localize any point bony tenderness, range of motion is severely limited by pain  Neurologic: Left foot with normal sensory function  Psychiatric: Anxious affect    Medical records reviewed for continuity of care.  No recent visits for similar symptoms.  Patient was seen 10/20/2019 for dental pain and dental injury.  Noted that she was 5 weeks pregnant at that time.    Labs:  Labs Reviewed - No data to display    Radiology:  DX-ANKLE 2- VIEWS LEFT   Final Result         1.  Interval reduction of ankle subluxation/dislocation.   2.  Posterior malleolar fracture      DX-ANKLE 3+ VIEWS LEFT   Final Result         1.  Oblique distal mid shaft fibular diaphyseal fracture.   2.  Widening of the ankle syndesmosis and medial ankle mortise joint with posterior lateral subluxation of the ankle mortise joint.              ED Medications Administered:  Medications   propofol (DIPRIVAN) injection (0 mcg/kg/min × 136.1 kg Intravenous See Anesthesia Record 1/16/20 2145)   oxyCODONE-acetaminophen (PERCOCET) 5-325 MG per tablet 1 Tab (1 Tab Oral Given 1/16/20 1913)   HYDROmorphone pf (DILAUDID) injection 0.5 mg (0.5 mg Intravenous Given 1/16/20 2007)   propofol (DIPRIVAN) injection (10 mg Intravenous Given 1/16/20 2228)       Differential  diagnosis:  Soft tissue injury, fracture, dislocation    MDM:  Patient presents with left injury that occurred immediately prior to arrival.  Left lower extremity is neurovascularly intact, with good pulses and normal capillary refill time.  Plain film demonstrates a distal midshaft fibular fracture, as well as widening of the ankle syndesmosis and medial ankle mortise joint with posterior lateral subluxation of the ankle mortise joint.  I discussed the case with Dr. Madsion, orthopedic surgeon on call this evening.  He reviewed images and requests close reduction, splint, as well as outpatient follow-up.  This was done according to the below procedure notes.  Repeat x-ray demonstrates a posterior malleolar fracture, as well as reduction of ankle subluxation/dislocation.  Plan at this time is for discharge home, patient is provided a prescription for Percocet for pain control.  She will contact the orthopedic clinic in the morning to schedule a follow-up appointment. Return precautions were discussed with the patient, and provided in written form with the patient's discharge instructions.     CONSCIOUS SEDATION PROCEDURE NOTE:  Patient identification was confirmed and patient consent was obtained verbally, written consent signed.  The procedure was performed by Dr. Ervin.  Anesthetic used: Propofol  Length of Time: Please see nursing notes for administration times of this medication  Other medications administered: None  Pre-procedure neuro examination revealed no deficits. Patient's airway remained patent, O2SAT adequate through procedure. Vitals remained stable. Patient tolerated procedure well without complications. Post-procedure exam showed patient w/o deficits. Sensory and motor intact. Patient returned to baseline prior to disposition.  Instructions for care discussed verbally and pt provided with additional written instructions for homecare and f/u.    REDUCTION PROCEDURE NOTE:  Patient identification  was confirmed, consent was obtained, written consent obtained, risks and benefits fully discussed.  This procedure was performed by Dr. Ervin  Site: Left ankle  Anesthetic used (type and amt): Propofol, please see nursing notes for time and dosages of administration  Pre-procedure N/V exam: Intact, normal pulses, normal sensation  # of attempts: 1  Type of splint: Posterior with stirrups, plaster  Pt anesthetized, fx/dislocation reduced successfully. Patient tolerated procedure well without complications. Patient splinted. Post-procedure exam indicates patient is n/v intact distal to the injury site. Post-procedure films show excellent alignment. Patient  returned to baseline prior to disposition. Instructions for care discussed verbally and patient provided with additional written instructions for homecare and f/u.    Blood pressure today is greater than 120/80, patient is instructed to follow up with primary care provider for blood pressure recheck.    Disposition:  Discharge home in stable condition    Final Impression:  1. Closed fracture of left ankle, initial encounter         In prescribing controlled substances to this patient, I certify that I have obtained and reviewed the medical history of Janice Goins. I have also made a good norma effort to obtain applicable records from other providers who have treated the patient and records did not demonstrate any increased risk of substance abuse that would prevent me from prescribing controlled substances.     I have conducted a physical exam and documented it. I have reviewed Ms. Goins’s prescription history as maintained by the [place] Prescription Monitoring Program.     I have assessed the patient’s risk for abuse, dependency, and addiction using the validated Opioid Risk Tool available at https://www.mdcalc.com/fhomic-oyrh-rhjv-ort-narcotic-abuse.     Given the above, I believe the benefits of controlled substance therapy outweigh the risks. The  reasons for prescribing controlled substances include non-narcotic, oral analgesic alternatives have been inadequate for pain control. Accordingly, I have discussed the risk and benefits, treatment plan, and alternative therapies with the patient.         Electronically signed by: Galina Ervin MD, 1/16/2020 11:38 PM

## 2020-01-17 NOTE — DISCHARGE INSTRUCTIONS
Please call Dr. Madison's clinic tomorrow morning to schedule a follow-up appointment for complete recheck.  His number is listed above.  Return to the emergency department if you develop any new or worsening symptoms, this includes worsening pain, swelling, numbness or tingling in the foot, redness, or if you have any further concerns.  You may take the oxycodone as prescribed for pain.

## 2020-01-29 ENCOUNTER — HOSPITAL ENCOUNTER (OUTPATIENT)
Facility: MEDICAL CENTER | Age: 35
End: 2020-01-29
Attending: ORTHOPAEDIC SURGERY | Admitting: ORTHOPAEDIC SURGERY

## 2020-01-29 ENCOUNTER — APPOINTMENT (OUTPATIENT)
Dept: RADIOLOGY | Facility: MEDICAL CENTER | Age: 35
End: 2020-01-29
Attending: ORTHOPAEDIC SURGERY

## 2020-01-29 VITALS
BODY MASS INDEX: 39.68 KG/M2 | RESPIRATION RATE: 18 BRPM | OXYGEN SATURATION: 97 % | SYSTOLIC BLOOD PRESSURE: 128 MMHG | HEART RATE: 95 BPM | TEMPERATURE: 97 F | DIASTOLIC BLOOD PRESSURE: 78 MMHG | HEIGHT: 72 IN | WEIGHT: 293 LBS

## 2020-01-29 LAB
B-HCG SERPL-ACNC: ABNORMAL MIU/ML (ref 0–5)
HCG UR QL: POSITIVE

## 2020-01-29 PROCEDURE — 84702 CHORIONIC GONADOTROPIN TEST: CPT

## 2020-01-29 PROCEDURE — 700111 HCHG RX REV CODE 636 W/ 250 OVERRIDE (IP)

## 2020-01-29 PROCEDURE — 81025 URINE PREGNANCY TEST: CPT

## 2020-01-29 PROCEDURE — 700105 HCHG RX REV CODE 258: Performed by: ORTHOPAEDIC SURGERY

## 2020-01-29 PROCEDURE — A9270 NON-COVERED ITEM OR SERVICE: HCPCS | Performed by: ANESTHESIOLOGY

## 2020-01-29 PROCEDURE — 700102 HCHG RX REV CODE 250 W/ 637 OVERRIDE(OP): Performed by: ANESTHESIOLOGY

## 2020-01-29 RX ORDER — GABAPENTIN 300 MG/1
300 CAPSULE ORAL ONCE
Status: COMPLETED | OUTPATIENT
Start: 2020-01-29 | End: 2020-01-29

## 2020-01-29 RX ORDER — SODIUM CHLORIDE, SODIUM LACTATE, POTASSIUM CHLORIDE, CALCIUM CHLORIDE 600; 310; 30; 20 MG/100ML; MG/100ML; MG/100ML; MG/100ML
INJECTION, SOLUTION INTRAVENOUS CONTINUOUS
Status: DISCONTINUED | OUTPATIENT
Start: 2020-01-29 | End: 2020-01-29 | Stop reason: HOSPADM

## 2020-01-29 RX ORDER — ACETAMINOPHEN 500 MG
1000 TABLET ORAL ONCE
Status: COMPLETED | OUTPATIENT
Start: 2020-01-29 | End: 2020-01-29

## 2020-01-29 RX ORDER — LIDOCAINE HYDROCHLORIDE 10 MG/ML
INJECTION, SOLUTION EPIDURAL; INFILTRATION; INTRACAUDAL; PERINEURAL
Status: COMPLETED
Start: 2020-01-29 | End: 2020-01-29

## 2020-01-29 RX ADMIN — ACETAMINOPHEN 1000 MG: 500 TABLET, FILM COATED ORAL at 11:11

## 2020-01-29 RX ADMIN — LIDOCAINE HYDROCHLORIDE 5 ML: 10 INJECTION, SOLUTION EPIDURAL; INFILTRATION; INTRACAUDAL at 11:12

## 2020-01-29 RX ADMIN — GABAPENTIN 300 MG: 300 CAPSULE ORAL at 11:12

## 2020-01-29 RX ADMIN — SODIUM CHLORIDE, POTASSIUM CHLORIDE, SODIUM LACTATE AND CALCIUM CHLORIDE: 600; 310; 30; 20 INJECTION, SOLUTION INTRAVENOUS at 11:36

## 2020-01-29 NOTE — PROGRESS NOTES
34yoF with left unstable ankle injury requiring surgical fixation. Preop labs confirmed positive pregnancy.  Discussed with Dr. Gonsalez and patient and we recommend postponing her surgery until she can be evaluated by OB-Gyn to discussed potential risks of surgery during pregnancy.  She does have an unstable ankle fracture that needs to be fixed and stabilized to give her the best potential functional outcome.  She will need surgery, ideally, within the next week to avoid more challenging fracture fixation.  Will discussed with case management options for scheduling urgent evaluation with OB-Gyn prior to rescheduling surgery, hopefully for sometime next week.

## 2020-01-29 NOTE — PROGRESS NOTES
Patient tested positive for HCG as part of pre-operative testing.  Qualitative HCG confirmed with Quantitative HCG.  Patient unaware of being possibly pregnant and does not want her family to be notified.  Pt states she is on the Depo regimen and has not had sexual relations for the last three months.  Given new development it is best that the patient be evaluated by OB/GYN prior to proceeding with any surgical/anesthetic interventions.  Per Dr. Sumner, it is preferable for the patient to have surgery within the next two weeks in order to optimize repair and healing.  Dr. Sumner, myself and the nursing staff spoke with the patient and informed her of the new findings and the situation.  Case management has been notified to assist patient in obtaining OB evaluation asap in order to expedite her surgical date.  Once viable pregnancy has been confirmed and the status of the fetus is known, then the patient can make an informed decision as to wether she would like to proceed with surgery/anesthesia given risks/benefits.

## 2020-01-29 NOTE — OR NURSING
Per. Dr. Sumner and Dr. Gonsalez, patient's surgery to be cancelled due to positive pregnancy test.     Received obgyn reference numbers from [facility], . Patient stating she wants to leave hospital and stressed the importance of patient following up with OB as well as Dr. Sumner office to reschedule her surgery and get OB clearance.     Patient verbalizes understanding and agrees to future POC, pt to call OB office ASAP. IV d/c'd pt d/c'd back home with mother. Pt received all belongings back.

## 2020-01-29 NOTE — OR NURSING
Positive HCG, surgeon and anes. Cancelled surgery. Pt notified. CM contacted to schedule OB/GYN for immediate follow up and reschedule surgery with Dr. Sumner.

## 2020-01-30 ENCOUNTER — INITIAL PRENATAL (OUTPATIENT)
Dept: OBGYN | Facility: CLINIC | Age: 35
End: 2020-01-30

## 2020-01-30 VITALS
SYSTOLIC BLOOD PRESSURE: 114 MMHG | DIASTOLIC BLOOD PRESSURE: 80 MMHG | HEIGHT: 71 IN | WEIGHT: 293 LBS | BODY MASS INDEX: 41.02 KG/M2

## 2020-01-30 DIAGNOSIS — N92.6 MISSED MENSES: ICD-10-CM

## 2020-01-30 PROCEDURE — 99213 OFFICE O/P EST LOW 20 MIN: CPT | Performed by: OBSTETRICS & GYNECOLOGY

## 2020-01-30 NOTE — LETTER
[facility]  [address] [place] [address]     January 30, 2020    Patient: Janice Goins   YOB: 1985   Date of Visit: 1/30/2020     To Whom It May Concern:    Janice Goins was seen for her confirmation of pregnancy visit today. She is due on 6/13/2020.   As she is in her second trimester, any surgical procedures can and should be done at this time.   We discussed the mode of anesthesia can be either general or local (spinal with sedation). She needs a doppler of the fetal heart beat prior to and after the procedure but she does not need continuous monitoring during the procedure.  She may have narcotics, acetaminophen, and most pre operative antibiotics (cephalosporins, penicillins, etc).   Should you have any questions, on day of surgery, our practice staffs an on-call physician in the hospital who can be reached at ext [phone].       Sincerely,           Davida Santiago D.O.

## 2020-01-30 NOTE — PROGRESS NOTES
Cc: Confirmation of pregnancy    HPI:  The patient is a 34 y.o.  presents for confirmation of pregnancy.  She was scheduled to have an ORIF for an unstable ankle fracture with orthopedics yesterday however the case was canceled due to a preop positive pregnancy test.  Beta hCG done at the time was 19,325.   This was an unplanned pregnancy. She was previously on depo provera and believes her last shot was in July in [place].     She presents for a confirmation of pregnancy.  She denies  fetal movement,  denies  vaginal bleeding,  denies  leakage of fluid,  denies contractions.   She denies nausea/vomiting, denies headache, and denies dysuria.      Review of systems:  Pertinent positives documented in HPI and all other systems reviewed & are negative    Gyn History: Since being on Depo-Provera patient does not have periods she only spots occasionally.  Believes her last shot of Deppe was in July some time however patient is unsure.    Pregnancy related complications:    OB History    Para Term  AB Living   6 5 5     5   SAB TAB Ectopic Molar Multiple Live Births             5      # Outcome Date GA Lbr Cody/2nd Weight Sex Delivery Anes PTL Lv   6 Current            5 Term 17 40w0d  2.778 kg (6 lb 2 oz) M Vag-Spont  N JESSIKA      Birth Comments: pt states no complications   4 Term 07/15/16 40w0d  3.515 kg (7 lb 12 oz) F Vag-Spont  N JESSIKA      Birth Comments: pt states no complications   3 Term 09/10/12 40w0d  3.289 kg (7 lb 4 oz) F Vag-Spont EPI N JESSIKA   2 Term 06 40w0d  4.791 kg (10 lb 9 oz) M Vag-Spont EPI N JESSIKA   1 Term 05 40w0d  3.856 kg (8 lb 8 oz) M Vag-Spont EPI N JESSIKA     Past Medical History:   Diagnosis Date   • Anemia      History reviewed. No pertinent surgical history.  Social History     Socioeconomic History   • Marital status: Single     Spouse name: Not on file   • Number of children: Not on file   • Years of education: Not on file   • Highest education level: Not on file  "  Occupational History   • Not on file   Social Needs   • Financial resource strain: Not on file   • Food insecurity:     Worry: Not on file     Inability: Not on file   • Transportation needs:     Medical: Not on file     Non-medical: Not on file   Tobacco Use   • Smoking status: Never Smoker   • Smokeless tobacco: Never Used   Substance and Sexual Activity   • Alcohol use: No   • Drug use: No   • Sexual activity: Yes     Partners: Male     Birth control/protection: Injection   Lifestyle   • Physical activity:     Days per week: Not on file     Minutes per session: Not on file   • Stress: Not on file   Relationships   • Social connections:     Talks on phone: Not on file     Gets together: Not on file     Attends Restoration service: Not on file     Active member of club or organization: Not on file     Attends meetings of clubs or organizations: Not on file     Relationship status: Not on file   • Intimate partner violence:     Fear of current or ex partner: Not on file     Emotionally abused: Not on file     Physically abused: Not on file     Forced sexual activity: Not on file   Other Topics Concern   • Not on file   Social History Narrative   • Not on file     Family History   Problem Relation Age of Onset   • No Known Problems Mother    • No Known Problems Father    • No Known Problems Brother    • No Known Problems Maternal Grandmother    • No Known Problems Maternal Grandfather      Allergies:   Allergies as of 01/30/2020 - Reviewed 01/30/2020   Allergen Reaction Noted   • Tramadol Unspecified 01/29/2020       PE:    /80   Ht 1.803 m (5' 11\")   Wt (!) 136.5 kg (301 lb)     General:appears stated age  Head: normocephalic, non-tender  Neck: neck is supple  Abdomen: Bowel sounds positive, nondistended, soft, nontender x4, no rebound or guarding. No organomegaly. No masses.  Female GYN: Deferred  Skin: No rashes, or ulcers or lesions seen  Ext: Ambulation is very difficult for the patient.  Her left ankle " is splinted.  Psychiatric: Patient shows appropriate affect, is alert and oriented x3, intact judgment and insight.    transabdominal scan and per my read:    Indication: Positive pregnancy test.  Last menstrual period abnormal     Findings: kenyon intrauterine pregnancy in variable position.   BPD 20 weeks 1 day  HC 20 weeks 2 days  AC 21 weeks 5 days  FL 20 weeks 5 days  Ultrasound average age 20 weeks 5 days  positive fetal cardiac activity @152 BPM. Right ovary no masses. Left Ovary no masses. Cervical length grossly normal on transabdominal ultrasound. No free fluid in the cul-de-sac.    Impression: viable IUP @20 weeks 5 days . EDC by US of 2020    DATIN-5-week ultrasound as per ACOG guidelines.  GOLDEN 2020.    A/P:   1. Missed menses       Spent 20 minutes in face-to-face patient contact in which greater than 50% of that visit was spent in counseling/coordination of care of newly diagnosed pregnancy including medical and surgical options of care.  Preoperative planning: Discussed that in the second trimester the rates of spontaneous miscarriage after surgery is lower and equates to the baseline rate of second trimester miscarriage.  Therefore I advised her to return to her orthopedic physician and schedule the surgery for when she needs it.  We discussed types of anesthesia today.  General anesthesia can be utilized as well as spinal anesthesia with sedation but this is dependent upon the anesthesiologist that she will have.  We discussed she would avoid ibuprofen however she may have narcotics and also Tylenol.  Most preoperative antibiotics are also considered safe in pregnancy.  I wrote the patient a letter to contact her orthopedic surgeon.  I will also attempt to CC this chart to them today.  Return for new OB visit in 2 to 4 weeks.

## 2020-01-30 NOTE — PROGRESS NOTES
Pt. Here for DUB visit today.  # [phone]  First prenatal care  Pt. States had no idea she was pregnant until she was going in for surgery yesterday and was told she is pregnant and now needs a medical clearance. Pt was last sexually active in September.   Pharmacy verified  Chaperone offered and declined

## 2020-02-04 ENCOUNTER — ANESTHESIA EVENT (OUTPATIENT)
Dept: SURGERY | Facility: MEDICAL CENTER | Age: 35
End: 2020-02-04
Payer: MEDICAID

## 2020-02-04 ENCOUNTER — APPOINTMENT (OUTPATIENT)
Dept: RADIOLOGY | Facility: MEDICAL CENTER | Age: 35
End: 2020-02-04
Attending: ORTHOPAEDIC SURGERY
Payer: MEDICAID

## 2020-02-04 ENCOUNTER — ANESTHESIA (OUTPATIENT)
Dept: SURGERY | Facility: MEDICAL CENTER | Age: 35
End: 2020-02-04
Payer: MEDICAID

## 2020-02-04 ENCOUNTER — HOSPITAL ENCOUNTER (OUTPATIENT)
Facility: MEDICAL CENTER | Age: 35
End: 2020-02-04
Attending: ORTHOPAEDIC SURGERY | Admitting: ORTHOPAEDIC SURGERY
Payer: MEDICAID

## 2020-02-04 VITALS
SYSTOLIC BLOOD PRESSURE: 107 MMHG | WEIGHT: 293 LBS | HEIGHT: 71 IN | OXYGEN SATURATION: 100 % | HEART RATE: 89 BPM | BODY MASS INDEX: 41.02 KG/M2 | TEMPERATURE: 97.5 F | RESPIRATION RATE: 16 BRPM | DIASTOLIC BLOOD PRESSURE: 75 MMHG

## 2020-02-04 DIAGNOSIS — G89.18 POSTOPERATIVE PAIN OF EXTREMITY: ICD-10-CM

## 2020-02-04 DIAGNOSIS — M79.609 POSTOPERATIVE PAIN OF EXTREMITY: ICD-10-CM

## 2020-02-04 PROBLEM — E66.01 MORBID OBESITY WITH BMI OF 40.0-44.9, ADULT (HCC): Status: ACTIVE | Noted: 2020-02-04

## 2020-02-04 LAB — HCG UR QL: POSITIVE

## 2020-02-04 PROCEDURE — 160046 HCHG PACU - 1ST 60 MINS PHASE II: Performed by: ORTHOPAEDIC SURGERY

## 2020-02-04 PROCEDURE — A6454 SELF-ADHER BAND W>=3" <5"/YD: HCPCS | Performed by: ORTHOPAEDIC SURGERY

## 2020-02-04 PROCEDURE — 160029 HCHG SURGERY MINUTES - 1ST 30 MINS LEVEL 4: Performed by: ORTHOPAEDIC SURGERY

## 2020-02-04 PROCEDURE — A6222 GAUZE <=16 IN NO W/SAL W/O B: HCPCS | Performed by: ORTHOPAEDIC SURGERY

## 2020-02-04 PROCEDURE — 160002 HCHG RECOVERY MINUTES (STAT): Performed by: ORTHOPAEDIC SURGERY

## 2020-02-04 PROCEDURE — 160009 HCHG ANES TIME/MIN: Performed by: ORTHOPAEDIC SURGERY

## 2020-02-04 PROCEDURE — 160041 HCHG SURGERY MINUTES - EA ADDL 1 MIN LEVEL 4: Performed by: ORTHOPAEDIC SURGERY

## 2020-02-04 PROCEDURE — 501445 HCHG STAPLER, SKIN DISP: Performed by: ORTHOPAEDIC SURGERY

## 2020-02-04 PROCEDURE — A9270 NON-COVERED ITEM OR SERVICE: HCPCS | Performed by: ANESTHESIOLOGY

## 2020-02-04 PROCEDURE — 160047 HCHG PACU  - EA ADDL 30 MINS PHASE II: Performed by: ORTHOPAEDIC SURGERY

## 2020-02-04 PROCEDURE — 700105 HCHG RX REV CODE 258: Performed by: ORTHOPAEDIC SURGERY

## 2020-02-04 PROCEDURE — 73610 X-RAY EXAM OF ANKLE: CPT | Mod: LT

## 2020-02-04 PROCEDURE — 501838 HCHG SUTURE GENERAL: Performed by: ORTHOPAEDIC SURGERY

## 2020-02-04 PROCEDURE — 81025 URINE PREGNANCY TEST: CPT

## 2020-02-04 PROCEDURE — 700111 HCHG RX REV CODE 636 W/ 250 OVERRIDE (IP)

## 2020-02-04 PROCEDURE — 700101 HCHG RX REV CODE 250: Performed by: ANESTHESIOLOGY

## 2020-02-04 PROCEDURE — 700101 HCHG RX REV CODE 250: Performed by: ORTHOPAEDIC SURGERY

## 2020-02-04 PROCEDURE — 160035 HCHG PACU - 1ST 60 MINS PHASE I: Performed by: ORTHOPAEDIC SURGERY

## 2020-02-04 PROCEDURE — 160036 HCHG PACU - EA ADDL 30 MINS PHASE I: Performed by: ORTHOPAEDIC SURGERY

## 2020-02-04 PROCEDURE — 160025 RECOVERY II MINUTES (STATS): Performed by: ORTHOPAEDIC SURGERY

## 2020-02-04 PROCEDURE — 160048 HCHG OR STATISTICAL LEVEL 1-5: Performed by: ORTHOPAEDIC SURGERY

## 2020-02-04 PROCEDURE — 700102 HCHG RX REV CODE 250 W/ 637 OVERRIDE(OP): Performed by: ANESTHESIOLOGY

## 2020-02-04 PROCEDURE — C1713 ANCHOR/SCREW BN/BN,TIS/BN: HCPCS | Performed by: ORTHOPAEDIC SURGERY

## 2020-02-04 PROCEDURE — 64445 NJX AA&/STRD SCIATIC NRV IMG: CPT | Performed by: ORTHOPAEDIC SURGERY

## 2020-02-04 PROCEDURE — 700111 HCHG RX REV CODE 636 W/ 250 OVERRIDE (IP): Performed by: ANESTHESIOLOGY

## 2020-02-04 DEVICE — IMPLANTABLE DEVICE: Type: IMPLANTABLE DEVICE | Site: FIBULA | Status: FUNCTIONAL

## 2020-02-04 DEVICE — SCREW CORT 3.5X50MM ST HEX (4TX6+1T3=27)(SDS=18): Type: IMPLANTABLE DEVICE | Site: FIBULA | Status: FUNCTIONAL

## 2020-02-04 DEVICE — PLATE LCP 1/3 TUBULAR 10-H (4SFLX1=4): Type: IMPLANTABLE DEVICE | Site: FIBULA | Status: FUNCTIONAL

## 2020-02-04 DEVICE — SCREW CORT 3.5X12MM ST HEX (4TX6+1TX4+1TX3=31)(SDS=22): Type: IMPLANTABLE DEVICE | Site: FIBULA | Status: FUNCTIONAL

## 2020-02-04 DEVICE — SCREW CORT 3.5X46MM ST HEX - (4SFLX6+MDFTX3=27): Type: IMPLANTABLE DEVICE | Site: FIBULA | Status: FUNCTIONAL

## 2020-02-04 DEVICE — SCREW CORT 3.5X10MM ST HEX (4TX6+1TX4+1TX3=31)(SDS=22): Type: IMPLANTABLE DEVICE | Site: FIBULA | Status: FUNCTIONAL

## 2020-02-04 RX ORDER — ONDANSETRON 2 MG/ML
4 INJECTION INTRAMUSCULAR; INTRAVENOUS
Status: DISCONTINUED | OUTPATIENT
Start: 2020-02-04 | End: 2020-02-04 | Stop reason: HOSPADM

## 2020-02-04 RX ORDER — SODIUM CHLORIDE, SODIUM LACTATE, POTASSIUM CHLORIDE, CALCIUM CHLORIDE 600; 310; 30; 20 MG/100ML; MG/100ML; MG/100ML; MG/100ML
INJECTION, SOLUTION INTRAVENOUS CONTINUOUS
Status: DISCONTINUED | OUTPATIENT
Start: 2020-02-04 | End: 2020-02-04 | Stop reason: HOSPADM

## 2020-02-04 RX ORDER — ACETAMINOPHEN 500 MG
1000 TABLET ORAL ONCE
Status: COMPLETED | OUTPATIENT
Start: 2020-02-04 | End: 2020-02-04

## 2020-02-04 RX ORDER — PHENYLEPHRINE HCL IN 0.9% NACL 0.5 MG/5ML
SYRINGE (ML) INTRAVENOUS PRN
Status: DISCONTINUED | OUTPATIENT
Start: 2020-02-04 | End: 2020-02-04 | Stop reason: SURG

## 2020-02-04 RX ORDER — CEFAZOLIN SODIUM 1 G/3ML
INJECTION, POWDER, FOR SOLUTION INTRAMUSCULAR; INTRAVENOUS PRN
Status: DISCONTINUED | OUTPATIENT
Start: 2020-02-04 | End: 2020-02-04 | Stop reason: SURG

## 2020-02-04 RX ORDER — MIDAZOLAM HYDROCHLORIDE 1 MG/ML
INJECTION INTRAMUSCULAR; INTRAVENOUS PRN
Status: DISCONTINUED | OUTPATIENT
Start: 2020-02-04 | End: 2020-02-04 | Stop reason: SURG

## 2020-02-04 RX ORDER — BUPIVACAINE HYDROCHLORIDE 5 MG/ML
INJECTION, SOLUTION EPIDURAL; INTRACAUDAL PRN
Status: DISCONTINUED | OUTPATIENT
Start: 2020-02-04 | End: 2020-02-04 | Stop reason: SURG

## 2020-02-04 RX ORDER — LIDOCAINE HYDROCHLORIDE 10 MG/ML
INJECTION, SOLUTION EPIDURAL; INFILTRATION; INTRACAUDAL; PERINEURAL
Status: COMPLETED
Start: 2020-02-04 | End: 2020-02-04

## 2020-02-04 RX ORDER — LIDOCAINE HCL/EPINEPHRINE/PF 2%-1:200K
VIAL (ML) INJECTION PRN
Status: DISCONTINUED | OUTPATIENT
Start: 2020-02-04 | End: 2020-02-04 | Stop reason: SURG

## 2020-02-04 RX ORDER — LIDOCAINE HYDROCHLORIDE 20 MG/ML
INJECTION, SOLUTION EPIDURAL; INFILTRATION; INTRACAUDAL; PERINEURAL PRN
Status: DISCONTINUED | OUTPATIENT
Start: 2020-02-04 | End: 2020-02-04 | Stop reason: SURG

## 2020-02-04 RX ORDER — OXYCODONE HCL 5 MG/5 ML
5 SOLUTION, ORAL ORAL
Status: COMPLETED | OUTPATIENT
Start: 2020-02-04 | End: 2020-02-04

## 2020-02-04 RX ORDER — MAGNESIUM HYDROXIDE 1200 MG/15ML
LIQUID ORAL
Status: COMPLETED | OUTPATIENT
Start: 2020-02-04 | End: 2020-02-04

## 2020-02-04 RX ORDER — OXYCODONE HCL 5 MG/5 ML
10 SOLUTION, ORAL ORAL
Status: COMPLETED | OUTPATIENT
Start: 2020-02-04 | End: 2020-02-04

## 2020-02-04 RX ORDER — LIDOCAINE HYDROCHLORIDE AND EPINEPHRINE 15; 5 MG/ML; UG/ML
INJECTION, SOLUTION EPIDURAL PRN
Status: DISCONTINUED | OUTPATIENT
Start: 2020-02-04 | End: 2020-02-04 | Stop reason: SURG

## 2020-02-04 RX ADMIN — EPHEDRINE SULFATE 5 MG: 50 INJECTION, SOLUTION INTRAVENOUS at 16:47

## 2020-02-04 RX ADMIN — Medication 100 MCG: at 16:29

## 2020-02-04 RX ADMIN — EPHEDRINE SULFATE 5 MG: 50 INJECTION, SOLUTION INTRAVENOUS at 16:43

## 2020-02-04 RX ADMIN — SODIUM CHLORIDE, POTASSIUM CHLORIDE, SODIUM LACTATE AND CALCIUM CHLORIDE: 600; 310; 30; 20 INJECTION, SOLUTION INTRAVENOUS at 16:00

## 2020-02-04 RX ADMIN — LIDOCAINE HYDROCHLORIDE 10 ML: 20 INJECTION, SOLUTION EPIDURAL; INFILTRATION; INTRACAUDAL at 15:55

## 2020-02-04 RX ADMIN — BUPIVACAINE HYDROCHLORIDE 20 ML: 5 INJECTION, SOLUTION EPIDURAL; INTRACAUDAL; PERINEURAL at 15:26

## 2020-02-04 RX ADMIN — FENTANYL CITRATE 50 MCG: 50 INJECTION, SOLUTION INTRAMUSCULAR; INTRAVENOUS at 15:55

## 2020-02-04 RX ADMIN — MIDAZOLAM HYDROCHLORIDE 0.5 MG: 1 INJECTION, SOLUTION INTRAMUSCULAR; INTRAVENOUS at 15:13

## 2020-02-04 RX ADMIN — ACETAMINOPHEN 1000 MG: 500 TABLET ORAL at 18:35

## 2020-02-04 RX ADMIN — Medication 100 MCG: at 16:14

## 2020-02-04 RX ADMIN — PROPOFOL 75 MCG/KG/MIN: 10 INJECTION, EMULSION INTRAVENOUS at 16:34

## 2020-02-04 RX ADMIN — ACETAMINOPHEN 1000 MG: 500 TABLET ORAL at 11:59

## 2020-02-04 RX ADMIN — SODIUM CHLORIDE, POTASSIUM CHLORIDE, SODIUM LACTATE AND CALCIUM CHLORIDE: 600; 310; 30; 20 INJECTION, SOLUTION INTRAVENOUS at 17:38

## 2020-02-04 RX ADMIN — Medication 100 MCG: at 17:53

## 2020-02-04 RX ADMIN — OXYCODONE HYDROCHLORIDE 5 MG: 5 SOLUTION ORAL at 18:54

## 2020-02-04 RX ADMIN — Medication 100 MCG: at 16:06

## 2020-02-04 RX ADMIN — Medication 200 MCG: at 17:45

## 2020-02-04 RX ADMIN — Medication 200 MCG: at 17:09

## 2020-02-04 RX ADMIN — Medication 100 MCG: at 16:23

## 2020-02-04 RX ADMIN — Medication 100 MCG: at 16:44

## 2020-02-04 RX ADMIN — Medication 200 MCG: at 16:34

## 2020-02-04 RX ADMIN — Medication 0.5 ML: at 12:00

## 2020-02-04 RX ADMIN — CEFAZOLIN 3 G: 330 INJECTION, POWDER, FOR SOLUTION INTRAMUSCULAR; INTRAVENOUS at 15:56

## 2020-02-04 RX ADMIN — LIDOCAINE HYDROCHLORIDE,EPINEPHRINE BITARTRATE 5 ML: 20; .005 INJECTION, SOLUTION EPIDURAL; INFILTRATION; INTRACAUDAL; PERINEURAL at 16:51

## 2020-02-04 RX ADMIN — SODIUM CHLORIDE, POTASSIUM CHLORIDE, SODIUM LACTATE AND CALCIUM CHLORIDE: 600; 310; 30; 20 INJECTION, SOLUTION INTRAVENOUS at 12:00

## 2020-02-04 RX ADMIN — LIDOCAINE HYDROCHLORIDE 0.5 ML: 10 INJECTION, SOLUTION EPIDURAL; INFILTRATION; INTRACAUDAL at 12:00

## 2020-02-04 RX ADMIN — MIDAZOLAM HYDROCHLORIDE 1 MG: 1 INJECTION, SOLUTION INTRAMUSCULAR; INTRAVENOUS at 17:05

## 2020-02-04 RX ADMIN — LIDOCAINE HYDROCHLORIDE 5 ML: 20 INJECTION, SOLUTION EPIDURAL; INFILTRATION; INTRACAUDAL at 16:00

## 2020-02-04 RX ADMIN — PROPOFOL 100 MCG/KG/MIN: 10 INJECTION, EMULSION INTRAVENOUS at 15:55

## 2020-02-04 RX ADMIN — LIDOCAINE HYDROCHLORIDE,EPINEPHRINE BITARTRATE 5 ML: 15; .005 INJECTION, SOLUTION EPIDURAL; INFILTRATION; INTRACAUDAL; PERINEURAL at 15:49

## 2020-02-04 RX ADMIN — EPHEDRINE SULFATE 5 MG: 50 INJECTION, SOLUTION INTRAVENOUS at 17:10

## 2020-02-04 RX ADMIN — LIDOCAINE HYDROCHLORIDE,EPINEPHRINE BITARTRATE 5 ML: 20; .005 INJECTION, SOLUTION EPIDURAL; INFILTRATION; INTRACAUDAL; PERINEURAL at 17:05

## 2020-02-04 RX ADMIN — FENTANYL CITRATE 50 MCG: 50 INJECTION, SOLUTION INTRAMUSCULAR; INTRAVENOUS at 15:13

## 2020-02-04 RX ADMIN — Medication 100 MCG: at 16:54

## 2020-02-04 RX ADMIN — MIDAZOLAM HYDROCHLORIDE 1.5 MG: 1 INJECTION, SOLUTION INTRAMUSCULAR; INTRAVENOUS at 15:55

## 2020-02-04 ASSESSMENT — PAIN SCALES - GENERAL: PAIN_LEVEL: 3

## 2020-02-05 NOTE — DISCHARGE INSTRUCTIONS
ACTIVITY: Rest and take it easy for the first 24 hours.  A responsible adult is recommended to remain with you during that time.  It is normal to feel sleepy.  We encourage you to not do anything that requires balance, judgment or coordination.    MILD FLU-LIKE SYMPTOMS ARE NORMAL. YOU MAY EXPERIENCE GENERALIZED MUSCLE ACHES, THROAT IRRITATION, HEADACHE AND/OR SOME NAUSEA.    FOR 24 HOURS DO NOT:  Drive, operate machinery or run household appliances.  Drink beer or alcoholic beverages.   Make important decisions or sign legal documents.    SPECIAL INSTRUCTIONS:   Non Weight Bearing  Left Lower leg in splint with crutches  --keep splint clean and dry  --follow  2 weeks postop  --tylenol PRN postop pain      DIET: To avoid nausea, slowly advance diet as tolerated, avoiding spicy or greasy foods for the first day.  Add more substantial food to your diet according to your physician's instructions. INCREASE FLUIDS AND FIBER TO AVOID CONSTIPATION.        FOLLOW-UP APPOINTMENT:  A follow-up appointment should be arranged with your doctor in 2 weeks; call to schedule.    You should CALL YOUR PHYSICIAN if you develop:  Fever greater than 101 degrees F.  Pain not relieved by medication, or persistent nausea or vomiting.  Excessive bleeding (blood soaking through dressing) or unexpected drainage from the wound.  Extreme redness or swelling around the incision site, drainage of pus or foul smelling drainage.  Inability to urinate or empty your bladder within 8 hours.  Problems with breathing or chest pain.    You should call [phone] if you develop problems with breathing or chest pain.  If you are unable to contact your doctor or surgical center, you should go to the nearest emergency room or urgent care center. Dr     Physician's telephone #: [phone]    If any questions arise, call your doctor.  If your doctor is not available, please feel free to call the Surgical Center at [phone].  The Center is open Monday through Friday  from 7AM to 7PM.  You can also call the HEALTH HOTLINE open 24 hours/day, 7 days/week and speak to a nurse at [phone], or toll free at [phone].    A registered nurse may call you a few days after your surgery to see how you are doing after your procedure.    MEDICATIONS: Resume taking daily medication.  Take prescribed pain medication with food.  If no medication is prescribed, you may take non-aspirin pain medication if needed.  PAIN MEDICATION CAN BE VERY CONSTIPATING.  Take a stool softener or laxative such as senokot, pericolace, or milk of magnesia if needed.    Prescription given for Tylanol for pain.  Last pain medication given at 6:35 PM.    If your physician has prescribed pain medication that includes Acetaminophen (Tylenol), do not take additional Acetaminophen (Tylenol) while taking the prescribed medication.    Depression / Suicide Risk    As you are discharged from this [facility] facility, it is important to learn how to keep safe from harming yourself.    Recognize the warning signs:  · Abrupt changes in personality, positive or negative- including increase in energy   · Giving away possessions  · Change in eating patterns- significant weight changes-  positive or negative  · Change in sleeping patterns- unable to sleep or sleeping all the time   · Unwillingness or inability to communicate  · Depression  · Unusual sadness, discouragement and loneliness  · Talk of wanting to die  · Neglect of personal appearance   · Rebelliousness- reckless behavior  · Withdrawal from people/activities they love  · Confusion- inability to concentrate     If you or a loved one observes any of these behaviors or has concerns about self-harm, here's what you can do:  · Talk about it- your feelings and reasons for harming yourself  · Remove any means that you might use to hurt yourself (examples: pills, rope, extension cords, firearm)  · Get professional help from the community (Mental Health, Substance  Abuse, psychological counseling)  · Do not be alone:Call your Safe Contact- someone whom you trust who will be there for you.  · Call your local CRISIS HOTLINE 209-7330 or 780-945-4176  · Call your local Children's Mobile Crisis Response Team [place] (054) 632-2572 or www.[employer]  · Call the toll free National Suicide Prevention Hotlines   · National Suicide Prevention Lifeline 934-400-SVLW (7895)  · National Hope Line Network 800-SUICIDE (733-8616)

## 2020-02-05 NOTE — ANESTHESIA PREPROCEDURE EVALUATION
33 yo female 20 week IUP with left fibula fracture and syndesmosis injury    Relevant Problems   ANESTHESIA (within normal limits)      Other   (+) Morbid obesity with BMI of 40.0-44.9, adult (HCC)     No family hx anesthesia complications  Mild GERD    Physical Exam    Airway   Mallampati: II  TM distance: >3 FB  Neck ROM: full       Cardiovascular   Rhythm: regular  Rate: normal  (-) murmur     Dental - normal exam         Pulmonary   Breath sounds clear to auscultation     Abdominal    Neurological - normal exam                 Anesthesia Plan    ASA 3   ASA physical status 3 criteria: morbid obesity - BMI greater than or equal to 40    Plan - epidural and peripheral nerve block     Peripheral nerve block will be post-op pain control            Postoperative Plan: Postoperative administration of opioids is intended.    Pertinent diagnostic labs and testing reviewed    Informed Consent:    Anesthetic plan and risks discussed with patient.

## 2020-02-05 NOTE — ANESTHESIA TIME REPORT
Anesthesia Start and Stop Event Times     Date Time Event    2/4/2020 1457 Ready for Procedure     1508 Anesthesia Start     1807 Anesthesia Stop        Responsible Staff  02/04/20    Name Role Begin End    Yvette Paredes M.D. Anesth 1508 1807        Preop Diagnosis (Free Text):  Pre-op Diagnosis     CLOSED FRACTURE OF SHAFT OF FIBULA        Preop Diagnosis (Codes):    Post op Diagnosis  Left fibular fracture  left ankel syndesmosis    Premium Reason  A. 3PM - 7AM    Comments:

## 2020-02-05 NOTE — OP REPORT
DATE OF SERVICE:  02/04/2020    PREOPERATIVE DIAGNOSES:  1.  Left fibular shaft fracture.  2.  Left posterior malleolus ankle fracture.  3.  Left ankle syndesmosis disruption.    POSTOPERATIVE DIAGNOSES:  1.  Left fibular shaft fracture.  2.  Left posterior malleolus ankle fracture.  3.  Left ankle syndesmosis disruption.    PROCEDURES PERFORMED:  1.  Open treatment with internal fixation of left fibular shaft fracture.  2.  Open treatment with internal fixation of left ankle syndesmosis.  3.  Closed treatment of left posterior malleolus fracture.  4.  Physician applied stress examination under fluoroscopy, left ankle.    SURGEON:  Abram Sumner MD.    ANESTHESIOLOGIST:  Yvette Paredes MD.    ANESTHESIA:  Epidural and regional.    ESTIMATED BLOOD LOSS:  Minimal.    TOURNIQUET TIME:  94 minutes at 250 mmHg to left thigh.    INDICATION FOR PROCEDURE:  The patient is a 34-year-old female.  She is now   about 3 weeks out from a left ankle fracture dislocation consistent with a   Maisonneuve variant injury pattern.  She had a closed reduction and splinting   applied.  She followed up with me postoperatively and plans were made for   surgical intervention.  Surgery was actually scheduled last week, but it was   found that preoperatively her urine pregnancy test and then subsequently her   serum pregnancy test was positive and she was unaware of this.  Dr. Gonsalez and I   at that time elected to postpone the procedure and have her evaluated by   OB/GYN, which she was able to have done last week.  They cleared her for   surgical intervention and we rescheduled the surgery for today.  She signed   informed consent preoperatively and she wished to proceed with surgery as   outlined above.    DESCRIPTION OF PROCEDURE:  The patient was met in the preop holding area and   her surgical site was signed and her consent was confirmed to be accurate.    She was taken back to the operating room and Dr. Paredes performed a regional    anesthetic at my request to help with intraoperative and postoperative pain   control.  She has been also placed on epidural catheter for anesthesia during   the surgery.  She was then positioned in the supine position.  Left lower   extremity, I removed her splint and then provisionally cleansed the limb with   alcohol.  I applied tourniquet.  The left lower extremity was then prepped and   draped in the usual sterile fashion.  A formal timeout was performed to   confirm patient's correct name, correct surgical site, correct procedure and   correct laterality.  Fluoroscopic imaging was used to localize the area of the   fibular fracture and then dissection was carried sharply down through the   skin and subcutaneous tissue.  Bovie cautery was used to dissect through the   deeper subcutaneous tissue down to the peroneal fascia, which was split   longitudinally.  I split the peroneal muscle down to the fracture site.  There   was early soft callus formation and I spent some time removing soft callus   from the large posterior butterfly fragment in the proximal and distal   portions.  I was ultimately able to mobilize the fracture segments, but it was   difficult to get it out to length.  I tried a couple different techniques to   get it out to length including freely mobilizing the fracture segments and   using push screws to distract with a laminar , but one screw failed   and then one screw let us some comminution at the anterior portion of the   fibula, but this actually helped me get the reduction and then fine tune the   length with reduction forceps, and I have placed lag screws across the   butterfly fragment proximally and distally with two 2.7 mm cortical screws to   achieve interfragmentary compression.  I confirmed the fibula fractures at the   length based on cortical reads as well as the fibular length at the ankle   joint.  I then stabilized the fracture of the fibular shaft with a 10-hole  1/3   tubular locking plate with 3 bicortical nonlocking screws proximally and   distally.  Lateral views of the ankle confirmed congruent alignment.  There   was some minimal displacement of a small shell of the posterior malleolus   fracture that I did not feel required stabilization.  Ankle syndesmosis was   reduced pretty well.  Fluoroscopic imaging with external rotation stress   confirmed instability at the ankle syndesmosis.  I therefore made a medial   incision and extended this incision down to the ankle syndesmosis area.  I   positioned a 2-hole 1/3 tubular nonlocking plate at the lateral malleolus and   used a periarticular reduction forceps and a percutaneous medial incision to   compress the ankle syndesmosis slightly.  I then placed 2 quadricortical 3.5   mm cortical screws to stabilize the ankle syndesmosis through the plate.  I   then removed the reduction forceps and final fluoroscopic imaging confirmed   overall acceptable alignment of the fracture and acceptable position of the   implants.  The wounds were thoroughly irrigated with normal saline.  I   reapproximated the peroneal fascia with 0 Vicryl, subcutaneous layers with 0   Vicryl and 2-0 Vicryl, and the skin edges with staples.  The wounds were   thoroughly cleansed and dried and a sterile compressive dressing and a   well-padded short leg plaster splint were applied.  The tourniquet was then   deflated after 94 minutes.  She was awoken from sedation, transferred on the   [facility] and taken to postanesthesia care unit in stable condition.    PLAN:  1.  Patient will be discharged home from the PACU when her epidural has worn   off and if she is mobilizing safely.  2.  She should be nonweightbearing to left lower extremity for 6-8 weeks   postop.  3.  She will be given Tylenol only for postoperative analgesia based on the   patient's request as well as a goal to minimize postoperative opioids given   that she is pregnant.  4.  She will follow up  with me 2 weeks postop for routine wound check and   staple removal.       ____________________________________     MD JOSHUA Ruiz / KANDIS    DD:  02/04/2020 18:04:52  DT:  02/04/2020 18:51:54    D#:  [id number]  Job#:  [id number]

## 2020-02-05 NOTE — ANESTHESIA PROCEDURE NOTES
Epidural Block  Date/Time: 2/4/2020 3:35 PM  Performed by: Yvette Paredes M.D.  Authorized by: Yvette Paredes M.D.     Patient Location:  OR  Start Time:  2/4/2020 3:35 PM  End Time:  2/4/2020 3:52 PM  Reason for Block: primary anesthetic    patient identified, IV checked, site marked, risks and benefits discussed, surgical consent, monitors and equipment checked, pre-op evaluation and timeout performed    Patient Position:  Sitting  Prep: ChloraPrep, patient draped and sterile technique    Monitoring:  Blood pressure, cardiac monitor, continuous pulse oximetry and heart rate  Approach:  Midline  Location:  L3-L4  Injection Technique:  MADDISON saline  Skin infiltration:  Lidocaine  Strength:  1%  Dose:  3ml  Needle Type:  Tuohy  Needle Gauge:  17 G  Needle Length:  3.5 in  Loss of resistance::  6  Catheter Size:  20 G  Catheter at Skin Depth:  10  Test Dose:  Lidocaine 1.5% with epinephrine 1-to-200,000  Test Dose Result:  Negative

## 2020-02-05 NOTE — OR SURGEON
Immediate Post OP Note    PreOp Diagnosis: Left subacute maisonneuve fx/dislocation, posterior malleolus fracture    PostOp Diagnosis: same    Procedure(s):  ORIF, FRACTURE, FIBULA-SHAFT, - Wound Class: Clean  ORIF, ANKLE-SYNDESMOSIS, AND OTHER INDICATED PROCEDURES - Wound Class: Clean    Surgeon(s):  Abram Sumner M.D.    Anesthesiologist/Type of Anesthesia:  Anesthesiologist: Yvette Paredes M.D.  Anesthesia Technician: Lane Alston/Spinal    Surgical Staff:  Circulator: Virgen Pascal R.N.  Relief Circulator: Iraida Ruiz R.N.  Scrub Person: Jose Roberto Sepulveda R.N.    Specimens removed if any:  * No specimens in log *    Estimated Blood Loss: minimal    Findings: see dictation    PLAN:  --discharge to home when epidural wears off and mobilizing safely  --NWB LLE in splint with crutches  --keep splint c/d/i  --fu 2 weeks postop  --tylenol PRN postop pain    Complications: no known complications        2/4/2020 5:54 PM Abram Sumner M.D.

## 2020-02-05 NOTE — ANESTHESIA PROCEDURE NOTES
Peripheral Block  Date/Time: 2/4/2020 3:20 PM  Performed by: Yvette Paredes M.D.  Authorized by: Yvette Paredes M.D.     Patient Location:  OR  Start Time:  2/4/2020 3:20 PM  End Time:  2/4/2020 3:28 PM  Reason for Block: at surgeon's request and post-op pain management    patient identified, IV checked, site marked, risks and benefits discussed, surgical consent, monitors and equipment checked, pre-op evaluation and timeout performed    Patient Position:  Supine  Prep: ChloraPrep    Monitoring:  Heart rate, continuous pulse ox and cardiac monitor  Block Region:  Lower Extremity  Lower Extremity - Block Type:  SCIATIC nerve block, lateral approach    Laterality:  Left  Procedures: ultrasound guided  Image captured, interpreted and electronically stored.  Local Infiltration:  Lidocaine  Strength:  1 %  Dose:  5 ml  Block Type:  Single-shot  Needle Length:  100mm  Needle Gauge:  21 G  Needle Localization:  Ultrasound guidance  Injection Assessment:  Negative aspiration for heme, no paresthesia on injection, incremental injection and local visualized surrounding nerve on ultrasound  Evidence of intravascular injection: No     US Guided Sciatic Nerve Block   US probe placed several cm proximal to popliteal crease on posterior thigh and scanned caudad and cephalad until Sciatic Nerve (SN) identified superficial/lateral to popliteal artery.  Needle inserted lateral to probe in an in plane approach under direct visualization to a perineural position.  After negative aspiration LA injected with ease and visualized surrounding the SN.  Difficult block placement given patient size

## 2020-02-05 NOTE — ANESTHESIA POSTPROCEDURE EVALUATION
Patient: Janice Goins    Procedure Summary     Date:  02/04/20 Room / Location:  [place] [id number] / SURGERY [facility]    Anesthesia Start:  1508 Anesthesia Stop:  1807    Procedures:       ORIF, FRACTURE, FIBULA-SHAFT, (Left Leg Lower)      ORIF, ANKLE-SYNDESMOSIS, AND OTHER INDICATED PROCEDURES (Left Ankle) Diagnosis:  (CLOSED FRACTURE OF SHAFT OF FIBULA)    Surgeon:  Abram Sumner M.D. Responsible Provider:  Yvette Paredes M.D.    Anesthesia Type:  epidural, peripheral nerve block ASA Status:  3          Final Anesthesia Type: epidural, peripheral nerve block  Last vitals  BP   Blood Pressure: 110/45    Temp   36.1 °C (97 °F)    Pulse   Pulse: 84   Resp   16    SpO2   100 %      Anesthesia Post Evaluation    Patient location during evaluation: PACU  Patient participation: complete - patient participated  Level of consciousness: awake  Pain score: 3    Airway patency: patent  Anesthetic complications: no  Cardiovascular status: adequate  Respiratory status: acceptable  Hydration status: acceptable    PONV: none           Nurse Pain Score: 3 (NPRS)

## 2020-02-05 NOTE — OR NURSING
Pt arrived to floor from PACU. Pt A+Ox4, able to make needs known, PIV Patent and SL. Pain 4/10 to LLE.   CSMT's and MANDO's WNL, Elevation, Ice pack applied.      Pt's VSS; denies N/V; states pain is 4/10 but tolerable level. Dressing CDI to LLE. D/c orders received. IV dc'd. Pt changed into clothing with assistance. Discharge instructions given; pt and family verbalized understanding and questions answered. Patient states ready to d/c home. Pt  Completed phase 2.

## 2020-02-28 ENCOUNTER — INITIAL PRENATAL (OUTPATIENT)
Dept: OBGYN | Facility: CLINIC | Age: 35
End: 2020-02-28
Payer: MEDICAID

## 2020-02-28 VITALS — SYSTOLIC BLOOD PRESSURE: 122 MMHG | DIASTOLIC BLOOD PRESSURE: 74 MMHG | BODY MASS INDEX: 43.1 KG/M2 | WEIGHT: 293 LBS

## 2020-02-28 DIAGNOSIS — Z34.82 ENCOUNTER FOR SUPERVISION OF OTHER NORMAL PREGNANCY, SECOND TRIMESTER: Primary | ICD-10-CM

## 2020-02-28 PROCEDURE — 0500F INITIAL PRENATAL CARE VISIT: CPT | Performed by: NURSE PRACTITIONER

## 2020-02-28 ASSESSMENT — ENCOUNTER SYMPTOMS
PSYCHIATRIC NEGATIVE: 1
GASTROINTESTINAL NEGATIVE: 1
CARDIOVASCULAR NEGATIVE: 1
EYES NEGATIVE: 1
RESPIRATORY NEGATIVE: 1
CONSTITUTIONAL NEGATIVE: 1
MYALGIAS: 1
NEUROLOGICAL NEGATIVE: 1

## 2020-02-28 NOTE — PROGRESS NOTES
Pt. Here for NOB visit today.  # [phone]  First prenatal care  Pt. States no complaints   Reports +FM   Pharmacy verified  Pt declines CF  Chaperone offered and provided

## 2020-02-28 NOTE — PROGRESS NOTES
S:  Janice Goins is a 34 y.o.  who presents for her new OB exam.  She is 24w6d with and GOLDEN of Estimated Date of Delivery: 20 based off of US . She has no complaints.  She is currently working at [employer] but not right now d/t broken leg,denies current heavy lifting or chemical exposure. Had  here in January and x 1 ER visits for broken leg.     Too late  AFP.  Declines CF.  Denies VB, LOF, or cramping.  Denies dysuria, vaginal DC. Reports normal fetal movement.     Pt is single and lives with  and other children.  Pregnancy is unplanned but desired.      Discussed diet and exercise during pregnancy. Encouraged good nutrition, and daily exercise including walking or swimming. Discussed expected weight gain during pregnancy. Discussed adequate hydration during pregnancy.  Review of Systems   Constitutional: Negative.    Eyes: Negative.    Respiratory: Negative.    Cardiovascular: Negative.    Gastrointestinal: Negative.    Genitourinary: Negative.    Musculoskeletal: Positive for myalgias.        Left leg sore d/t being broken   Skin: Negative.    Neurological: Negative.    Endo/Heme/Allergies: Negative.    Psychiatric/Behavioral: Negative.    All other systems reviewed and are negative.      Past Medical History:   Diagnosis Date   • Anemia      Family History   Problem Relation Age of Onset   • No Known Problems Mother    • No Known Problems Father    • No Known Problems Brother    • No Known Problems Maternal Grandmother    • No Known Problems Maternal Grandfather      Social History     Socioeconomic History   • Marital status: Single     Spouse name: Not on file   • Number of children: Not on file   • Years of education: Not on file   • Highest education level: Not on file   Occupational History   • Not on file   Social Needs   • Financial resource strain: Not on file   • Food insecurity     Worry: Not on file     Inability: Not on file   • Transportation needs     Medical: Not on file      Non-medical: Not on file   Tobacco Use   • Smoking status: Never Smoker   • Smokeless tobacco: Never Used   Substance and Sexual Activity   • Alcohol use: No   • Drug use: No   • Sexual activity: Not Currently     Partners: Male   Lifestyle   • Physical activity     Days per week: Not on file     Minutes per session: Not on file   • Stress: Not on file   Relationships   • Social connections     Talks on phone: Not on file     Gets together: Not on file     Attends Quaker service: Not on file     Active member of club or organization: Not on file     Attends meetings of clubs or organizations: Not on file     Relationship status: Not on file   • Intimate partner violence     Fear of current or ex partner: Not on file     Emotionally abused: Not on file     Physically abused: Not on file     Forced sexual activity: Not on file   Other Topics Concern   • Not on file   Social History Narrative   • Not on file     OB History    Para Term  AB Living   6 5 5     5   SAB TAB Ectopic Molar Multiple Live Births             5      # Outcome Date GA Lbr Cody/2nd Weight Sex Delivery Anes PTL Lv   6 Current            5 Term 17 40w0d  2.778 kg (6 lb 2 oz) M Vag-Spont  N JESSIKA      Birth Comments: pt states no complications   4 Term 07/15/ 40w0d  3.515 kg (7 lb 12 oz) F Vag-Spont  N JESSIKA      Birth Comments: pt states no complications   3 Term 09/10/12 40w0d  3.289 kg (7 lb 4 oz) F Vag-Spont EPI N JESSIKA   2 Term 06 40w0d  4.791 kg (10 lb 9 oz) M Vag-Spont EPI N JESSIKA   1 Term 05 40w0d  3.856 kg (8 lb 8 oz) M Vag-Spont EPI N JESSIKA       History of Varicella Virus: had as child  History of HSV I or II in self or partner: denies  History of Thyroid problems: denies    O:  Wt (!) 140.2 kg (309 lb)    See Prenatal Physical.    Wet mount: deferred  Physical Exam   Constitutional: She is oriented to person, place, and time and well-developed, well-nourished, and in no distress.   HENT:   Head: Normocephalic  and atraumatic.   Nose: Nose normal.   Eyes: Pupils are equal, round, and reactive to light. Conjunctivae and EOM are normal.   Neck: Neck supple. No thyromegaly present.   Cardiovascular: Normal rate, regular rhythm, normal heart sounds and intact distal pulses.   Pulmonary/Chest: Effort normal and breath sounds normal.   Abdominal: Soft. Bowel sounds are normal. There is no abdominal tenderness.   Musculoskeletal: Normal range of motion.         General: No edema.   Neurological: She is alert and oriented to person, place, and time. Gait normal.   In walking boot with crutches     Skin: Skin is warm and dry.   Psychiatric: Mood, memory, affect and judgment normal.   Nursing note and vitals reviewed.        A:   1.  IUP @ 24w6d per US        2.  S=D        3.  See problem list below        4.  Broken left leg in boot on crutches        5. Normal pap last year per pt report     Patient Active Problem List    Diagnosis Date Noted   • Morbid obesity with BMI of 40.0-44.9, adult ([facility]) 02/04/2020   • Anemia affecting pregnancy 07/06/2016   • Encounter for supervision of normal pregnancy 06/29/2016   • History of macrosomia in infant in prior pregnancy, currently pregnant in third trimester 06/29/2016   • Infected dental carries 06/29/2016         P:  1.  Pap done last year in [place], will request record        2.  Prenatal labs ordered with urine GC/CT - lab slip given        3.  Discussed PNV, diet, avoidances and adequate water intake        4.  NOB packet given        5.  Return to office in 4 wks        6.  Complete OB US in ASAP wks            Orders Placed This Encounter   • US-OB 2ND 3RD TRI COMPLETE   • PREG CNTR PRENATAL PN   • HEP C VIRUS ANTIBODY   • URINE DRUG SCREEN W/CONF (AR)   • Prenatal Vit-Fe Fumarate-FA (PRENATAL 1+1 PO)

## 2020-02-28 NOTE — LETTER
Cystic Fibrosis Carrier Testing  Janice Goins    The following information is about a blood test that can be done to determine if you and/or your partner carry the gene for cystic fibrosis.    WHAT IS CYSTIC FIBROSIS?  · Cystic fibrosis (CF) is an inherited disease that affects more than 25,000 American children and young adults.  · Symptoms of CF vary but include lung congestion, pneumonia, diarrhea and poor growth.  Most people with CF have severe medical problems and some die at a young age.  Others have so few symptoms they are unaware they have CF.  · CF does not affect intelligence.  · Although there is no cure for CF at this time, scientists are making progress in improving treatment and in searching for a cure.  In the past many people with CF  at a very young age.  Today, many are living into their 20’s and 30’s.    IS THERE A CHANCE MY BABY COULD HAVE CYSTIC FIBROSIS?  · You can have a child with CF even if there is no history in your family (see chart below).  · CF testing can help determine if you are a carrier and at risk to have a child with CF.  Note: if both parents are carriers, there is a 1 in 4 (25%) chance with each pregnancy that they will have a child with CF.  · Carriers have one normal CF gene and one altered CF gene.  · People with CF have two altered CF genes.  · Most people have two normal copies of the CF gene.    Approximate risk that a couple with no family history of cystic fibrosis will have a child with cystic fibrosis:    Ethnic background / Risk     couple:  1 in 2,500   couple:  1 in 15,000            couple:  1 in 8,000     American couple:  1 in 32,000     WHAT TESTING IS AVAILABLE?  · There is a blood test that can be done to find out if you or your partner is a carrier.  · It is important to understand that CF carrier testing does not detect all CF carriers.  · If the test shows that you are both CF carriers, you unborn baby can  be tested to find out if the baby has CF.    HOW MUCH DOES IT COST TO HAVE CYSTIC FIBROSIS CARRIER TESTING?  · Cost and insurance coverage for CF carrier testing vary depending upon the laboratory used and your insurance policy.  · The average cost for CF carrier testing is $300 per person.  · Your genetic counselor can provide you with more information about cystic fibrosis carrier testing.    _____  Yes, I am interested in discussing carrier testing with a genetic counselor.    _____  No, I am not interested in CF carrier testing or in receiving more information about CF carrier testing.      Client signature: ________________________________________  2/28/2020

## 2020-03-09 ENCOUNTER — APPOINTMENT (OUTPATIENT)
Dept: RADIOLOGY | Facility: IMAGING CENTER | Age: 35
End: 2020-03-09
Attending: NURSE PRACTITIONER
Payer: MEDICAID

## 2020-03-09 DIAGNOSIS — Z34.82 ENCOUNTER FOR SUPERVISION OF OTHER NORMAL PREGNANCY, SECOND TRIMESTER: ICD-10-CM

## 2020-03-09 PROCEDURE — 76805 OB US >/= 14 WKS SNGL FETUS: CPT | Mod: TC | Performed by: OBSTETRICS & GYNECOLOGY

## 2020-03-31 DIAGNOSIS — Z34.82 ENCOUNTER FOR SUPERVISION OF OTHER NORMAL PREGNANCY, SECOND TRIMESTER: ICD-10-CM

## 2020-05-29 ENCOUNTER — APPOINTMENT (OUTPATIENT)
Dept: RADIOLOGY | Facility: MEDICAL CENTER | Age: 35
End: 2020-05-29
Attending: ADVANCED PRACTICE MIDWIFE
Payer: MEDICAID

## 2020-05-29 ENCOUNTER — HOSPITAL ENCOUNTER (OUTPATIENT)
Facility: MEDICAL CENTER | Age: 35
End: 2020-05-29
Attending: OBSTETRICS & GYNECOLOGY | Admitting: OBSTETRICS & GYNECOLOGY
Payer: MEDICAID

## 2020-05-29 ENCOUNTER — ROUTINE PRENATAL (OUTPATIENT)
Dept: OBGYN | Facility: CLINIC | Age: 35
End: 2020-05-29
Payer: MEDICAID

## 2020-05-29 VITALS — DIASTOLIC BLOOD PRESSURE: 66 MMHG | WEIGHT: 293 LBS | SYSTOLIC BLOOD PRESSURE: 130 MMHG | BODY MASS INDEX: 45.05 KG/M2

## 2020-05-29 VITALS
HEIGHT: 71 IN | OXYGEN SATURATION: 98 % | SYSTOLIC BLOOD PRESSURE: 110 MMHG | BODY MASS INDEX: 41.02 KG/M2 | RESPIRATION RATE: 20 BRPM | WEIGHT: 293 LBS | TEMPERATURE: 97.6 F | HEART RATE: 89 BPM | DIASTOLIC BLOOD PRESSURE: 61 MMHG

## 2020-05-29 DIAGNOSIS — Z34.82 ENCOUNTER FOR SUPERVISION OF OTHER NORMAL PREGNANCY, SECOND TRIMESTER: Primary | ICD-10-CM

## 2020-05-29 LAB
ALBUMIN SERPL BCP-MCNC: 3.4 G/DL (ref 3.2–4.9)
ALBUMIN/GLOB SERPL: 0.9 G/DL
ALP SERPL-CCNC: 131 U/L (ref 30–99)
ALT SERPL-CCNC: 11 U/L (ref 2–50)
AMPHET UR QL SCN: NEGATIVE
ANION GAP SERPL CALC-SCNC: 16 MMOL/L (ref 7–16)
ANISOCYTOSIS BLD QL SMEAR: ABNORMAL
APPEARANCE UR: CLEAR
APTT PPP: 26.6 SEC (ref 24.7–36)
AST SERPL-CCNC: 18 U/L (ref 12–45)
BARBITURATES UR QL SCN: NEGATIVE
BASOPHILS # BLD AUTO: 0.9 % (ref 0–1.8)
BASOPHILS # BLD: 0.12 K/UL (ref 0–0.12)
BENZODIAZ UR QL SCN: NEGATIVE
BILIRUB SERPL-MCNC: 0.4 MG/DL (ref 0.1–1.5)
BUN SERPL-MCNC: 13 MG/DL (ref 8–22)
BZE UR QL SCN: NEGATIVE
CALCIUM SERPL-MCNC: 8.3 MG/DL (ref 8.5–10.5)
CANNABINOIDS UR QL SCN: NEGATIVE
CHLORIDE SERPL-SCNC: 102 MMOL/L (ref 96–112)
CO2 SERPL-SCNC: 18 MMOL/L (ref 20–33)
COLOR UR AUTO: ABNORMAL
CREAT SERPL-MCNC: 0.68 MG/DL (ref 0.5–1.4)
DACRYOCYTES BLD QL SMEAR: NORMAL
EOSINOPHIL # BLD AUTO: 0.12 K/UL (ref 0–0.51)
EOSINOPHIL NFR BLD: 0.9 % (ref 0–6.9)
ERYTHROCYTE [DISTWIDTH] IN BLOOD BY AUTOMATED COUNT: 47 FL (ref 35.9–50)
GLOBULIN SER CALC-MCNC: 3.6 G/DL (ref 1.9–3.5)
GLUCOSE SERPL-MCNC: 78 MG/DL (ref 65–99)
GLUCOSE UR QL STRIP.AUTO: NEGATIVE MG/DL
HCT VFR BLD AUTO: 29.9 % (ref 37–47)
HGB BLD-MCNC: 8.5 G/DL (ref 12–16)
INR PPP: 1.02 (ref 0.87–1.13)
KETONES UR QL STRIP.AUTO: >=160 MG/DL
LEUKOCYTE ESTERASE UR QL STRIP.AUTO: ABNORMAL
LYMPHOCYTES # BLD AUTO: 1.74 K/UL (ref 1–4.8)
LYMPHOCYTES NFR BLD: 13 % (ref 22–41)
MANUAL DIFF BLD: NORMAL
MCH RBC QN AUTO: 21.3 PG (ref 27–33)
MCHC RBC AUTO-ENTMCNC: 28.4 G/DL (ref 33.6–35)
MCV RBC AUTO: 74.8 FL (ref 81.4–97.8)
METAMYELOCYTES NFR BLD MANUAL: 0.9 %
METHADONE UR QL SCN: NEGATIVE
MICROCYTES BLD QL SMEAR: ABNORMAL
MONOCYTES # BLD AUTO: 0.82 K/UL (ref 0–0.85)
MONOCYTES NFR BLD AUTO: 6.1 % (ref 0–13.4)
MORPHOLOGY BLD-IMP: NORMAL
MYELOCYTES NFR BLD MANUAL: 0.9 %
NEUTROPHILS # BLD AUTO: 10.37 K/UL (ref 2–7.15)
NEUTROPHILS NFR BLD: 77.4 % (ref 44–72)
NITRITE UR QL STRIP.AUTO: NEGATIVE
NRBC # BLD AUTO: 0.04 K/UL
NRBC BLD-RTO: 0.3 /100 WBC
OPIATES UR QL SCN: NEGATIVE
OVALOCYTES BLD QL SMEAR: NORMAL
OXYCODONE UR QL SCN: NEGATIVE
PCP UR QL SCN: NEGATIVE
PH UR STRIP.AUTO: 5.5 [PH] (ref 5–8)
PLATELET # BLD AUTO: 312 K/UL (ref 164–446)
PLATELET BLD QL SMEAR: NORMAL
PMV BLD AUTO: 10.4 FL (ref 9–12.9)
POIKILOCYTOSIS BLD QL SMEAR: NORMAL
POLYCHROMASIA BLD QL SMEAR: NORMAL
POTASSIUM SERPL-SCNC: 4.1 MMOL/L (ref 3.6–5.5)
PROPOXYPH UR QL SCN: NEGATIVE
PROT SERPL-MCNC: 7 G/DL (ref 6–8.2)
PROT UR QL STRIP: ABNORMAL MG/DL
PROTHROMBIN TIME: 13.7 SEC (ref 12–14.6)
RBC # BLD AUTO: 4 M/UL (ref 4.2–5.4)
RBC BLD AUTO: PRESENT
RBC UR QL AUTO: NEGATIVE
SODIUM SERPL-SCNC: 136 MMOL/L (ref 135–145)
SP GR UR: >=1.03 (ref 1–1.03)
TSH SERPL DL<=0.005 MIU/L-ACNC: 0.77 UIU/ML (ref 0.38–5.33)
WBC # BLD AUTO: 13.4 K/UL (ref 4.8–10.8)

## 2020-05-29 PROCEDURE — 59025 FETAL NON-STRESS TEST: CPT | Mod: 26 | Performed by: ADVANCED PRACTICE MIDWIFE

## 2020-05-29 PROCEDURE — 99214 OFFICE O/P EST MOD 30 MIN: CPT | Mod: 25 | Performed by: ADVANCED PRACTICE MIDWIFE

## 2020-05-29 PROCEDURE — 85610 PROTHROMBIN TIME: CPT

## 2020-05-29 PROCEDURE — 80053 COMPREHEN METABOLIC PANEL: CPT

## 2020-05-29 PROCEDURE — 90040 PR PRENATAL FOLLOW UP: CPT | Performed by: NURSE PRACTITIONER

## 2020-05-29 PROCEDURE — 85007 BL SMEAR W/DIFF WBC COUNT: CPT

## 2020-05-29 PROCEDURE — 85730 THROMBOPLASTIN TIME PARTIAL: CPT

## 2020-05-29 PROCEDURE — 85027 COMPLETE CBC AUTOMATED: CPT

## 2020-05-29 PROCEDURE — 80307 DRUG TEST PRSMV CHEM ANLYZR: CPT

## 2020-05-29 PROCEDURE — 36415 COLL VENOUS BLD VENIPUNCTURE: CPT

## 2020-05-29 PROCEDURE — 83789 MASS SPECTROMETRY QUAL/QUAN: CPT | Mod: XU

## 2020-05-29 PROCEDURE — 81002 URINALYSIS NONAUTO W/O SCOPE: CPT | Mod: XU

## 2020-05-29 PROCEDURE — 700102 HCHG RX REV CODE 250 W/ 637 OVERRIDE(OP): Performed by: STUDENT IN AN ORGANIZED HEALTH CARE EDUCATION/TRAINING PROGRAM

## 2020-05-29 PROCEDURE — 84443 ASSAY THYROID STIM HORMONE: CPT

## 2020-05-29 PROCEDURE — 76819 FETAL BIOPHYS PROFIL W/O NST: CPT

## 2020-05-29 PROCEDURE — A9270 NON-COVERED ITEM OR SERVICE: HCPCS | Performed by: STUDENT IN AN ORGANIZED HEALTH CARE EDUCATION/TRAINING PROGRAM

## 2020-05-29 RX ORDER — HYDROXYZINE PAMOATE 25 MG/1
CAPSULE ORAL
Qty: 30 CAP | Refills: 0 | Status: SHIPPED | OUTPATIENT
Start: 2020-05-29

## 2020-05-29 RX ORDER — METHYLPREDNISOLONE 4 MG/1
TABLET ORAL
Qty: 21 TAB | Refills: 0 | Status: ON HOLD | OUTPATIENT
Start: 2020-05-29 | End: 2020-06-06

## 2020-05-29 RX ORDER — SERTRALINE HYDROCHLORIDE 25 MG/1
25 TABLET, FILM COATED ORAL DAILY
Qty: 30 TAB | Refills: 11 | Status: SHIPPED | OUTPATIENT
Start: 2020-05-29

## 2020-05-29 RX ADMIN — ZINC OXIDE, PRAMOXINE HYDROCHLORIDE: 8; 1 LOTION TOPICAL at 20:13

## 2020-05-29 NOTE — PROGRESS NOTES
"Pt here today for OB follow up  Reports decreased Fm for the past 2 days.  WT: 323 lb  BP:  130/66  Preferred pharmacy verified with pt.  Pt states she wasn't able to come to her appts due to she broke her left leg.   PNP  And 1 hr gtt labs not done yet. Lab slips reprinted for pt.   ALISON sheet given and explained today  Pt states she has been feeling weak, \"something is not feeling right with my body\" pt states she has rashes all over her body, it happens at night time x 1 month.   Declines Tdap vaccine.  Desires BTL. Consent signed. Pt was explained that medicaid requires BTL to be sign 30 prior to delivery.   Good # [phone]    "

## 2020-05-29 NOTE — PROGRESS NOTES
S) Pt is a 34 y.o.   at 37w6d  gestation. Routine prenatal care today. Pt has not been seen since February.  She left [employer] (powering [employer])hart message today regarding intense itching and agreeable to come in today for assessment.  She has a son who is immunocompromised and so has not left her house since March.  Over the last couple of weeks she has been getting progressively itchy, all over her body, but notices it more on her hands, arms, legs and feet.  From the itching, she now has scabs and scars on her.  She denies bedbugs and her 2 year old sleeps in her bed and has no itching or scabs  She also has not come in d/t having an infection in her broken leg.  She was placed on abx and told to be on bedrest by her surgeon.  She states it has resolved.    She is very teary today, feeling like something is wrong with the pregnancy/baby and she has never had this happen with any of her other pregnancies.  Of note, there is an arranged adoption for this baby.  She overall is feeling very anxious and depressed, not just from this, but also from being homebound for the last several months.  She is very interested in both medication and therapy as she knows it could worsen and she is not feeling well at all.    Fetal movement Decreased  Cramping no  VB no  LOF no   Denies dysuria. Generally feels well today. Good self-care activities identified. Denies headaches, swelling, visual changes, or epigastric pain .     O) /66   Wt (!) 146.5 kg (323 lb)         Labs:       PNL: has not done       GCT:       AFP: Not Examined       GBS: N/A       Pertinent ultrasound - 3/9/20 anatomy scan WNL           A) IUP at 37w6d       S=D         Patient Active Problem List    Diagnosis Date Noted   • Morbid obesity with BMI of 40.0-44.9, adult ([facility]) 2020   • Anemia affecting pregnancy 2016   • Encounter for supervision of normal pregnancy 2016   • History of macrosomia in infant in prior pregnancy, currently pregnant in  third trimester 2016   • Infected dental carries 2016          SVE: deferred         TDAP: no       FLU: no        BTL: no       : no       C/S Consent: no       IOL or C/S scheduled: no       LAST PAP:          P) s/s ptl vs general discomforts. Fetal movements reviewed. General ed and anticipatory guidance. Nutrition/exercise/vitamin.   Sent to L&D for assessment.  Resident aware.  Consider labs to r/o cholestasis.   Started on zoloft 25mg PO daily.  Discussed risks, side effects and benefits  Referral to  and to arrange for visit with JENNY KELLY  IOL at 39 weeks if still pregnant.   GBS swab at hospital.

## 2020-05-29 NOTE — LETTER
"Count Your Baby's Movements  Another step to a healthy delivery    Janice Byrne             Dept: [phone]    How Many Weeks Pregnant? 37W6D    Date to Begin Counting: Today 05/29/2020              How to use this chart    One way for your physician to keep track of your baby's health is by knowing how often the baby moves (or \"kicks\") in your womb.  You can help your physician to do this by using this chart every day.    Every day, you should see how many hours it takes for your baby to move 10 times.  Start in the morning, as soon as you get up.    · First, write down the time your baby moves until you get to 10.  · Check off one box every time your baby moves until you get to 10.  · Write down the time you finished counting in the last column.  · Total how long it took to count up all 10 movements.  · Finally, fill in the box that shows how long this took.  After counting 10 movements, you no longer have to count any more that day.  The next morning, just start counting again as soon as you get up.    What should you call a \"movement\"?  It is hard to say, because it will feel different from one mother to another and from one pregnancy to the next.  The important thing is that you count the movements the same way throughout your pregnancy.  If you have more questions, you should ask your physician.    Count carefully every day!  SAMPLE:  Week 28    How many hours did it take to feel 10 movements?       Start  Time     1     2     3     4     5     6     7     8     9     10   Finish Time   Mon 8:20 ·  ·  ·  ·  ·  ·  ·  ·  ·  ·  11:40   Tue Wed Thu Fri               Sat               Sun                 IMPORTANT: You should contact your physician if it takes more than two hours for you to feel 10 movements.  Each morning, write down the time and start to count the movements of your baby.  Keep track by checking off one box every time you feel one movement.  When you " "have felt 10 \"kicks\", write down the time you finished counting in the last column.  Then fill in the   box (over the check elier) for the number of hours it took.  Be sure to read the complete instructions on the previous page.            "

## 2020-05-30 NOTE — PROGRESS NOTES
"1900-Report from DARBY Harding RN. POC discussed. Orders already in for caladryl lotion, waiting to come up from pharmacy  1940-Updated JENNY Kwon CNM in department on pt status, BPP ordered  1942-JENNY FLORESM at bedside  2010-Pt stated she would like to go home, \"my children are calling me\", requested when US will be here. Phoned US, switching techs now and will be up within the hour  2115- tech at bedside  2145-Phoned JENNY Kwon, updated on BPP results (see report) and strip reviewed, discharge orders received with instructions to follow up in office.  2156-Pt discharged home, ambulatory and undelivered. Provided general and term labor instructions, understanding verbalized  "

## 2020-05-30 NOTE — PROGRESS NOTES
Patient presents for a rash that she has had since march.  THis covers her legs and is dry with scabbing, it is on her hands and arms as well.  It itches more at night.  She also feels occasionally dizzy and generally unwell.  Dr Rodriguez called and at bedside to examine rash.  Labs ordered.      Report given to Megha NGUYEN.

## 2020-05-30 NOTE — PROGRESS NOTES
PATIENT ID:  NAME:  Janice Goins  MRN:               [id number]  YOB: 1985    CC:  rash    HPI:  Janice was seen at [facility] today and sent to L&D Triage for evaluation of a diffusely pruritic rash. Patient very anxious during discussion, though reports that she began to notice small bumps on her skin approximately 4 weeks ago. These are extremely itchy, and patient states that she notices a nocturnal prominence with respect to her pruritis. She has been scratching them frequently. Lesions started on her hands and legs and have remained there, sparing her torso and face. Janice states that she frequently shares her bed with her children, though none of them have developed similar lesions. She denies any recent travel though reports that her children got a new bed 2 months ago, which she has removed from the house. Patient states that she has also had excessive weight gain during the pregnancy, has felt increasingly fatigued for the past month, and has had decreased fetal movement in the past few days. She also reports heat intolerance with frequent diaphoresis. She has been experiencing frequent migraine headaches, which are associated with nausea, photophobia, and phonophobia. These are preceded by aura.  (completed by Bear Rodriguez MD)    Patient reports that after her leg injury, she was prescribed multiple antibiotics due to concern for infection. She first was started on one week of augmentin (per her report). She was then switched by the surgeon to Bactrim which she reports she was on for 3 weeks. She reports that her rash symptoms started while finishing the Bactrim. The rash was stable and then started resolving. She reports a few days ago she noted some leg pain and was fearful of reinfection so she began taking left over Bactrim at her own direction. (additional history by myself)     Pregnancy complicated by morbid obesity.      negative  For CTXS.   negative Feels pain   negative  for LOF  negative for vaginal bleeding.   decreased for fetal movement     ROS: Patient denies any fever, chills, nausea, vomiting, headache, chest pain, shortness of breath, or dysuria or unusual swelling of hands or feet.      PMHx: Anxiety, migraine headache  PSHx: Left Leg Fracture Repair  Medications: None  Allergies: Tramadol  Family Hx: Eczema       Prenatal Care: Obtained at [facility]    Recent Labs     05/29/20  1748   WBC 13.4*   RBC 4.00*   HEMOGLOBIN 8.5*   HEMATOCRIT 29.9*   MCV 74.8*   MCH 21.3*   RDW 47.0   PLATELETCT 312   MPV 10.4   NEUTSPOLYS 77.40*   LYMPHOCYTES 13.00*   MONOCYTES 6.10   EOSINOPHILS 0.90   BASOPHILS 0.90   RBCMORPHOLO Present     Recent Labs     05/29/20  1748   SODIUM 136   POTASSIUM 4.1   CHLORIDE 102   CO2 18*   GLUCOSE 78   BUN 13          IMAGING:  Anatomy OB ultrasound:   3/9/2020 8:51 AM     HISTORY/REASON FOR EXAM:  Evaluate fetal anatomy     TECHNIQUE/EXAM DESCRIPTION: OB complete ultrasound.     COMPARISON:  None     FINDINGS:  Fetal Lie:  Breech  LMP:  12/14/2019  Clinical GOLDEN by LMP:  6/13/2020     Placenta (Location):  Anterior  Placenta Previa: No  Placental Grade: I     Amniotic Fluid Volume:  CONNOR = 17.22 cm     Fetal Heart Rate:  135 bpm     Cervical Length:  4.71 cm transabdominal     No maternal adnexal mass is identified.     Umbilical Artery S/D Ratio(s):  Not applicable     Fetal Anatomy  (Seen or Not Seen)  Lateral Ventricles     Seen  Cisterna Magna        Seen  Cerebellum              Seen  CSP             Seen  Orbits             Seen  Face/Lips                Seen  Cord Insertion         Seen  Placental CI         Seen  4 Chamber Heart     Seen  LVOT               Seen  RVOT              Seen  3 Vessel View     Seen  Stomach       Seen  Kidneys                   Seen  Urinary Bladder      Seen  Spine                       Seen  3 Vessel Cord          Seen  Both Upper Extremities    Seen  Both Lower Extremities    Seen  Diaphragm              Seen  Movement       Seen  Gender:  Likely female     Fetal Biometry  BPD    6.56 cm, 26 weeks, 3 days  HC    24.99 cm, 27 weeks, 1 day  AC    22.06 cm, 26 weeks, 4 days  Femur Length    4.83 cm, 26 weeks, 1 day  Humerus Length    4.42 cm, 26 weeks, 2 days  Cerebellum Diameter   3.14 cm     EGA by this US:  26 weeks, 4 days  GOLDEN by this US: 2020  GOLDEN by 1st US:  2020 by MD     Estimated Fetal Weight:  938 grams  EFW Percentile: 45.1%     Comments:     IMPRESSION:     1.  Single intrauterine pregnancy of an estimated gestational age of 26 weeks, 4 days with an estimated date of delivery of 2020.     2.  No anatomic abnormalities identified.    POB Hx:  OB History    Para Term  AB Living   6 5 5     5   SAB TAB Ectopic Molar Multiple Live Births             5      # Outcome Date GA Lbr Cody/2nd Weight Sex Delivery Anes PTL Lv   6 Current            5 Term 17 40w0d  2.778 kg (6 lb 2 oz) M Vag-Spont  N JESSIKA      Birth Comments: pt states no complications   4 Term 07/15/16 40w0d  3.515 kg (7 lb 12 oz) F Vag-Spont  N JESSIKA      Birth Comments: pt states no complications   3 Term 09/10/12 40w0d  3.289 kg (7 lb 4 oz) F Vag-Spont EPI N JESSIKA   2 Term 06 40w0d  4.791 kg (10 lb 9 oz) M Vag-Spont EPI N JESSIKA   1 Term 05 40w0d  3.856 kg (8 lb 8 oz) M Vag-Spont EPI N JESSIKA       PMH/Problem List:    Past Medical History:   Diagnosis Date   • Anemia      Patient Active Problem List    Diagnosis Date Noted   • Morbid obesity with BMI of 40.0-44.9, adult ([facility]) 2020   • Anemia affecting pregnancy 2016   • Encounter for supervision of normal pregnancy 2016   • History of macrosomia in infant in prior pregnancy, currently pregnant in third trimester 2016   • Infected dental carries 2016       Current Outpatient Medications:  No current facility-administered medications on file prior to encounter.      Current Outpatient Medications on File Prior to Encounter    Medication Sig Dispense Refill   • sertraline (ZOLOFT) 25 MG tablet Take 1 Tab by mouth every day. 30 Tab 11   • Prenatal Vit-Fe Fumarate-FA (PRENATAL 1+1 PO) Take  by mouth.     • acetaminophen (TYLENOL) 500 MG Tab Take 500-1,000 mg by mouth every 6 hours as needed.         PSH:    Past Surgical History:   Procedure Laterality Date   • FIBULA ORIF Left 2020    Procedure: ORIF, FRACTURE, FIBULA-SHAFT,;  Surgeon: Abram Sumner M.D.;  Location: SURGERY [facility];  Service: Orthopedics   • ANKLE ORIF Left 2020    Procedure: ORIF, ANKLE-SYNDESMOSIS, AND OTHER INDICATED PROCEDURES;  Surgeon: Abram Sumner M.D.;  Location: SURGERY [facility];  Service: Orthopedics       Allergies:   Allergies   Allergen Reactions   • Tramadol Unspecified     migraine         PHYSICAL EXAM:  Vitals:    20 1746 20 1802 20 1816 20 1831   BP: 130/71 133/66 131/61 110/61   Pulse: 91 98 83 89   Resp:       Temp:       TempSrc:       SpO2:       Weight:       Height:         Temp (24hrs), Av.4 °C (97.6 °F), Min:36.4 °C (97.6 °F), Max:36.4 °C (97.6 °F)      General: Anxious appearing gravid woman in no acute distress   HEENT: Head normocephalic, eyes PERRLA  Cardiovascular: RRR, no murmurs, gallops, or rubs  Pulmonary: CTAB, symmetrical chest expansion, no rales, rhonchi, or wheezes  Abdominal: Gravid abdomen, distant bowel sounds, non-tender to palpation, EFW: 3000 g  Extremities: Moves all spontaneously, non-tender to palpation, 3+ bilateral pedal edema   Skin: Diffuse papular rash with lesions localized to the hands, forearms, upper legs, and lower legs, papules appear diffusely excoriated, no eczematous patches or striae of abdomen noted    A  1. 35 yo  with IUP 37w6d  2. Cat I FHR tracing   3. Rash  4. Anxiety vs Panic Disorder  5. Limited Prenatal Care    P  1. Discussed in detail with patient that her rash might be attributable to Bactrim use. I did discuss that we generally do  not prescribe Bactrim in the 1st and 3rd trimester related to increased incidence of hyperbiliuremia. I have also cautioned patient that she should not take any medications, especially while pregnant, without direct supervision of care provider. She voices understanding. Keep areas clean and dry. Start steroid pack.   2. I reviewed with patient that anxiety related to COVID can be normal but that her reported history lends to more panic disorder diagnosis. At this time, will prescribe vistaril for itching, insomnia, as well as panic. I explained general use to patient and she verbalizes understanding.She was started on Zoloft in the office but may be more appropriate for Prozac or Lexapro. Will monitor.  I also reviewed the importance of prenatal care while pregnant with patient.   3. We discussed her previous plan for adoption and she reports that she is still considering this but may not. This would be a family member adoption but I encouraged patient to discuss feelings with that person. She is aware that she does not need to make a decision while she is at the hospital if she does not desire to.  Labor precautions reviewed in detail with patient as BPP is reassuring.

## 2020-06-03 LAB
BILE AC SERPL-SCNC: 1.9 UMOL/L (ref 0–7)
CDCAE SERPL-SCNC: 0.7 UMOL/L (ref 0–3.4)
CHOLATE SERPL-SCNC: 0.5 UMOL/L (ref 0–1.9)
DO-CHOLATE SERPL-SCNC: 0.4 UMOL/L (ref 0–2.5)
URSODEOXYCHOLATE SERPL-SCNC: 0.3 UMOL/L (ref 0–1)

## 2020-06-04 ENCOUNTER — ROUTINE PRENATAL (OUTPATIENT)
Dept: OBGYN | Facility: CLINIC | Age: 35
End: 2020-06-04
Payer: MEDICAID

## 2020-06-04 VITALS — WEIGHT: 293 LBS | DIASTOLIC BLOOD PRESSURE: 68 MMHG | SYSTOLIC BLOOD PRESSURE: 122 MMHG | BODY MASS INDEX: 45.89 KG/M2

## 2020-06-04 DIAGNOSIS — Z34.83 ENCOUNTER FOR SUPERVISION OF OTHER NORMAL PREGNANCY IN THIRD TRIMESTER: Primary | ICD-10-CM

## 2020-06-04 PROCEDURE — 90040 PR PRENATAL FOLLOW UP: CPT | Performed by: NURSE PRACTITIONER

## 2020-06-04 ASSESSMENT — FIBROSIS 4 INDEX: FIB4 SCORE: 0.57

## 2020-06-04 NOTE — PROGRESS NOTES
Pt. Here for OB/FU. Reports Good FM.   Good # [phone]  Pt. Denies VB, LOF, or UC's.   Pharmacy verified.   Chaperone offered and not indicated  Pt states no complaints or concerns today.  Pt has not had her labs done. Refusal form given to her today, but pt did not want to sign. Pt states that she is not refusing to do her labs.

## 2020-06-04 NOTE — PROGRESS NOTES
S) Pt is a 34 y.o.   at 38w5d  gestation. Routine prenatal care today. Has not done hr labs yet but may do them over the next couple days therefore refusing to sign AMA paperwork.    Steroids prescribed on L&D have helped her rash and itching as well as the vistaril.  She is in good spirits today knowing that she will have IOL this weekend.   Fetal movement Normal  Cramping no  VB no  LOF no   Denies dysuria. Generally feels well today. Good self-care activities identified. Denies headaches, swelling, visual changes, or epigastric pain .     O) /68   Wt (!) 149.2 kg (329 lb)         Labs:       PNL: has not done       GCT:       AFP: Not Examined       GBS: N/A       Pertinent ultrasound - 3/9/20 anatomy scan WNL           A) IUP at 38w5d       S=D         Patient Active Problem List    Diagnosis Date Noted   • Morbid obesity with BMI of 40.0-44.9, adult ([facility]) 2020   • Anemia affecting pregnancy 2016   • Encounter for supervision of normal pregnancy 2016   • History of macrosomia in infant in prior pregnancy, currently pregnant in third trimester 2016   • Infected dental carries 2016          SVE: 1-2/40/ballotable, membranes swept at pt request after discussion of procedure         TDAP: no       FLU: no        BTL: no       : no       C/S Consent: no       IOL or C/S scheduled: yes, Saturday         LAST PAP:          P) Labour precautions discussed. Fetal movements reviewed. General ed and anticipatory guidance. Nutrition/exercise/vitamin. Plans breast Plans pp contraception- unsure  Continue PNV.   Encouraged to try to do her PNP/GTT and likelihood of doing glucose testing on baby after birth if not done.   IOL for Saturday night.  Explained procedure of otpt gel and inpt admit the following morning.    Also discussed covid testing on IOLs  RTC PRN

## 2020-06-05 ENCOUNTER — HOSPITAL ENCOUNTER (OUTPATIENT)
Dept: LAB | Facility: MEDICAL CENTER | Age: 35
End: 2020-06-05
Attending: NURSE PRACTITIONER
Payer: MEDICAID

## 2020-06-05 DIAGNOSIS — Z34.82 ENCOUNTER FOR SUPERVISION OF OTHER NORMAL PREGNANCY, SECOND TRIMESTER: ICD-10-CM

## 2020-06-05 LAB
ABO GROUP BLD: NORMAL
BACTERIA #/AREA URNS HPF: ABNORMAL /HPF
BLD GP AB SCN SERPL QL: NORMAL
EPI CELLS #/AREA URNS HPF: ABNORMAL /HPF
MANUAL DIFF BLD: NORMAL
MORPHOLOGY BLD-IMP: NORMAL
OVALOCYTES BLD QL SMEAR: NORMAL
PLATELET BLD QL SMEAR: NORMAL
POIKILOCYTOSIS BLD QL SMEAR: NORMAL
POLYCHROMASIA BLD QL SMEAR: NORMAL
RBC # URNS HPF: ABNORMAL /HPF
RBC BLD AUTO: PRESENT
RH BLD: NORMAL
WBC #/AREA URNS HPF: ABNORMAL /HPF

## 2020-06-05 PROCEDURE — 86803 HEPATITIS C AB TEST: CPT

## 2020-06-05 PROCEDURE — 86762 RUBELLA ANTIBODY: CPT

## 2020-06-05 PROCEDURE — 36415 COLL VENOUS BLD VENIPUNCTURE: CPT

## 2020-06-05 PROCEDURE — 86850 RBC ANTIBODY SCREEN: CPT

## 2020-06-05 PROCEDURE — 85007 BL SMEAR W/DIFF WBC COUNT: CPT

## 2020-06-05 PROCEDURE — 87340 HEPATITIS B SURFACE AG IA: CPT

## 2020-06-05 PROCEDURE — 87389 HIV-1 AG W/HIV-1&-2 AB AG IA: CPT

## 2020-06-05 PROCEDURE — 86900 BLOOD TYPING SEROLOGIC ABO: CPT

## 2020-06-05 PROCEDURE — 86901 BLOOD TYPING SEROLOGIC RH(D): CPT

## 2020-06-05 PROCEDURE — 81001 URINALYSIS AUTO W/SCOPE: CPT | Mod: XU

## 2020-06-05 PROCEDURE — 80307 DRUG TEST PRSMV CHEM ANLYZR: CPT

## 2020-06-05 PROCEDURE — 85027 COMPLETE CBC AUTOMATED: CPT

## 2020-06-05 PROCEDURE — 86780 TREPONEMA PALLIDUM: CPT

## 2020-06-06 ENCOUNTER — APPOINTMENT (OUTPATIENT)
Dept: OBGYN | Facility: MEDICAL CENTER | Age: 35
End: 2020-06-06
Attending: OBSTETRICS & GYNECOLOGY
Payer: MEDICAID

## 2020-06-06 ENCOUNTER — HOSPITAL ENCOUNTER (INPATIENT)
Facility: MEDICAL CENTER | Age: 35
LOS: 1 days | End: 2020-06-08
Attending: OBSTETRICS & GYNECOLOGY | Admitting: OBSTETRICS & GYNECOLOGY
Payer: MEDICAID

## 2020-06-06 LAB
ANISOCYTOSIS BLD QL SMEAR: ABNORMAL
APPEARANCE UR: ABNORMAL
BASOPHILS # BLD AUTO: 0 % (ref 0–1.8)
BASOPHILS # BLD AUTO: 0.3 % (ref 0–1.8)
BASOPHILS # BLD: 0 K/UL (ref 0–0.12)
BASOPHILS # BLD: 0.04 K/UL (ref 0–0.12)
BILIRUB UR QL STRIP.AUTO: NEGATIVE
COLOR UR: YELLOW
COVID ORDER STATUS COVID19: NORMAL
EOSINOPHIL # BLD AUTO: 0 K/UL (ref 0–0.51)
EOSINOPHIL # BLD AUTO: 0.11 K/UL (ref 0–0.51)
EOSINOPHIL NFR BLD: 0 % (ref 0–6.9)
EOSINOPHIL NFR BLD: 0.8 % (ref 0–6.9)
ERYTHROCYTE [DISTWIDTH] IN BLOOD BY AUTOMATED COUNT: 47.1 FL (ref 35.9–50)
ERYTHROCYTE [DISTWIDTH] IN BLOOD BY AUTOMATED COUNT: 48 FL (ref 35.9–50)
GLUCOSE UR STRIP.AUTO-MCNC: NEGATIVE MG/DL
HBV SURFACE AG SER QL: ABNORMAL
HCT VFR BLD AUTO: 27 % (ref 37–47)
HCT VFR BLD AUTO: 28.3 % (ref 37–47)
HCV AB SER QL: NORMAL
HGB BLD-MCNC: 7.9 G/DL (ref 12–16)
HGB BLD-MCNC: 8.2 G/DL (ref 12–16)
HIV 1+2 AB+HIV1 P24 AG SERPL QL IA: NORMAL
HOLDING TUBE BB 8507: NORMAL
HYPOCHROMIA BLD QL SMEAR: ABNORMAL
IMM GRANULOCYTES # BLD AUTO: 0.25 K/UL (ref 0–0.11)
IMM GRANULOCYTES NFR BLD AUTO: 1.8 % (ref 0–0.9)
KETONES UR STRIP.AUTO-MCNC: ABNORMAL MG/DL
LEUKOCYTE ESTERASE UR QL STRIP.AUTO: ABNORMAL
LYMPHOCYTES # BLD AUTO: 1.41 K/UL (ref 1–4.8)
LYMPHOCYTES # BLD AUTO: 2.19 K/UL (ref 1–4.8)
LYMPHOCYTES NFR BLD: 10.5 % (ref 22–41)
LYMPHOCYTES NFR BLD: 16 % (ref 22–41)
MCH RBC QN AUTO: 21.3 PG (ref 27–33)
MCH RBC QN AUTO: 21.5 PG (ref 27–33)
MCHC RBC AUTO-ENTMCNC: 29 G/DL (ref 33.6–35)
MCHC RBC AUTO-ENTMCNC: 29.3 G/DL (ref 33.6–35)
MCV RBC AUTO: 73.5 FL (ref 81.4–97.8)
MCV RBC AUTO: 73.6 FL (ref 81.4–97.8)
MICRO URNS: ABNORMAL
MICROCYTES BLD QL SMEAR: ABNORMAL
MONOCYTES # BLD AUTO: 0.71 K/UL (ref 0–0.85)
MONOCYTES # BLD AUTO: 0.72 K/UL (ref 0–0.85)
MONOCYTES NFR BLD AUTO: 5.2 % (ref 0–13.4)
MONOCYTES NFR BLD AUTO: 5.3 % (ref 0–13.4)
NEUTROPHILS # BLD AUTO: 10.41 K/UL (ref 2–7.15)
NEUTROPHILS # BLD AUTO: 11.28 K/UL (ref 2–7.15)
NEUTROPHILS NFR BLD: 75.9 % (ref 44–72)
NEUTROPHILS NFR BLD: 84.2 % (ref 44–72)
NITRITE UR QL STRIP.AUTO: NEGATIVE
NRBC # BLD AUTO: 0.04 K/UL
NRBC # BLD AUTO: 0.05 K/UL
NRBC BLD-RTO: 0.3 /100 WBC
NRBC BLD-RTO: 0.4 /100 WBC
PH UR STRIP.AUTO: 7.5 [PH] (ref 5–8)
PLATELET # BLD AUTO: 259 K/UL (ref 164–446)
PLATELET # BLD AUTO: 268 K/UL (ref 164–446)
PMV BLD AUTO: 10.9 FL (ref 9–12.9)
PMV BLD AUTO: 11.1 FL (ref 9–12.9)
PROT UR QL STRIP: 30 MG/DL
RBC # BLD AUTO: 3.67 M/UL (ref 4.2–5.4)
RBC # BLD AUTO: 3.85 M/UL (ref 4.2–5.4)
RBC UR QL AUTO: NEGATIVE
RUBV AB SER QL: 141 IU/ML
SARS-COV-2 RNA RESP QL NAA+PROBE: NOTDETECTED
SP GR UR STRIP.AUTO: 1.03
SPECIMEN SOURCE: NORMAL
TREPONEMA PALLIDUM IGG+IGM AB [PRESENCE] IN SERUM OR PLASMA BY IMMUNOASSAY: ABNORMAL
UROBILINOGEN UR STRIP.AUTO-MCNC: 1 MG/DL
WBC # BLD AUTO: 13.4 K/UL (ref 4.8–10.8)
WBC # BLD AUTO: 13.7 K/UL (ref 4.8–10.8)

## 2020-06-06 PROCEDURE — 700102 HCHG RX REV CODE 250 W/ 637 OVERRIDE(OP): Performed by: NURSE PRACTITIONER

## 2020-06-06 PROCEDURE — 700105 HCHG RX REV CODE 258: Performed by: NURSE PRACTITIONER

## 2020-06-06 PROCEDURE — 87081 CULTURE SCREEN ONLY: CPT

## 2020-06-06 PROCEDURE — 87653 STREP B DNA AMP PROBE: CPT

## 2020-06-06 PROCEDURE — 86850 RBC ANTIBODY SCREEN: CPT

## 2020-06-06 PROCEDURE — 80307 DRUG TEST PRSMV CHEM ANLYZR: CPT

## 2020-06-06 PROCEDURE — 85025 COMPLETE CBC W/AUTO DIFF WBC: CPT

## 2020-06-06 PROCEDURE — A9270 NON-COVERED ITEM OR SERVICE: HCPCS | Performed by: NURSE PRACTITIONER

## 2020-06-06 PROCEDURE — 86901 BLOOD TYPING SEROLOGIC RH(D): CPT

## 2020-06-06 PROCEDURE — 87150 DNA/RNA AMPLIFIED PROBE: CPT

## 2020-06-06 PROCEDURE — C9803 HOPD COVID-19 SPEC COLLECT: HCPCS | Performed by: OBSTETRICS & GYNECOLOGY

## 2020-06-06 PROCEDURE — U0004 COV-19 TEST NON-CDC HGH THRU: HCPCS

## 2020-06-06 RX ORDER — CARBOPROST TROMETHAMINE 250 UG/ML
250 INJECTION, SOLUTION INTRAMUSCULAR
Status: DISCONTINUED | OUTPATIENT
Start: 2020-06-06 | End: 2020-06-07 | Stop reason: HOSPADM

## 2020-06-06 RX ORDER — SODIUM CHLORIDE, SODIUM LACTATE, POTASSIUM CHLORIDE, CALCIUM CHLORIDE 600; 310; 30; 20 MG/100ML; MG/100ML; MG/100ML; MG/100ML
INJECTION, SOLUTION INTRAVENOUS CONTINUOUS
Status: DISPENSED | OUTPATIENT
Start: 2020-06-06 | End: 2020-06-07

## 2020-06-06 RX ORDER — METHYLERGONOVINE MALEATE 0.2 MG/ML
0.2 INJECTION INTRAVENOUS
Status: COMPLETED | OUTPATIENT
Start: 2020-06-06 | End: 2020-06-07

## 2020-06-06 RX ORDER — DEXTROSE, SODIUM CHLORIDE, SODIUM LACTATE, POTASSIUM CHLORIDE, AND CALCIUM CHLORIDE 5; .6; .31; .03; .02 G/100ML; G/100ML; G/100ML; G/100ML; G/100ML
INJECTION, SOLUTION INTRAVENOUS CONTINUOUS
Status: DISCONTINUED | OUTPATIENT
Start: 2020-06-07 | End: 2020-06-08 | Stop reason: HOSPADM

## 2020-06-06 RX ORDER — OXYTOCIN 10 [USP'U]/ML
10 INJECTION, SOLUTION INTRAMUSCULAR; INTRAVENOUS
Status: DISCONTINUED | OUTPATIENT
Start: 2020-06-06 | End: 2020-06-07 | Stop reason: HOSPADM

## 2020-06-06 RX ORDER — MISOPROSTOL 200 UG/1
800 TABLET ORAL
Status: COMPLETED | OUTPATIENT
Start: 2020-06-06 | End: 2020-06-07

## 2020-06-06 RX ORDER — TERBUTALINE SULFATE 1 MG/ML
0.25 INJECTION, SOLUTION SUBCUTANEOUS PRN
Status: DISCONTINUED | OUTPATIENT
Start: 2020-06-06 | End: 2020-06-07 | Stop reason: HOSPADM

## 2020-06-06 RX ORDER — CITRIC ACID/SODIUM CITRATE 334-500MG
30 SOLUTION, ORAL ORAL EVERY 6 HOURS PRN
Status: DISCONTINUED | OUTPATIENT
Start: 2020-06-06 | End: 2020-06-07 | Stop reason: HOSPADM

## 2020-06-06 RX ADMIN — SODIUM CHLORIDE, POTASSIUM CHLORIDE, SODIUM LACTATE AND CALCIUM CHLORIDE: 600; 310; 30; 20 INJECTION, SOLUTION INTRAVENOUS at 23:08

## 2020-06-06 RX ADMIN — DINOPROSTONE 0.5 MG: 0.5 GEL VAGINAL at 23:04

## 2020-06-06 RX ADMIN — SODIUM CITRATE AND CITRIC ACID MONOHYDRATE 30 ML: 500; 334 SOLUTION ORAL at 23:04

## 2020-06-06 SDOH — ECONOMIC STABILITY: FOOD INSECURITY: WITHIN THE PAST 12 MONTHS, YOU WORRIED THAT YOUR FOOD WOULD RUN OUT BEFORE YOU GOT MONEY TO BUY MORE.: PATIENT DECLINED

## 2020-06-06 SDOH — ECONOMIC STABILITY: FOOD INSECURITY: WITHIN THE PAST 12 MONTHS, THE FOOD YOU BOUGHT JUST DIDN'T LAST AND YOU DIDN'T HAVE MONEY TO GET MORE.: PATIENT DECLINED

## 2020-06-06 SDOH — ECONOMIC STABILITY: TRANSPORTATION INSECURITY
IN THE PAST 12 MONTHS, HAS LACK OF TRANSPORTATION KEPT YOU FROM MEETINGS, WORK, OR FROM GETTING THINGS NEEDED FOR DAILY LIVING?: PATIENT DECLINED

## 2020-06-06 SDOH — ECONOMIC STABILITY: TRANSPORTATION INSECURITY
IN THE PAST 12 MONTHS, HAS THE LACK OF TRANSPORTATION KEPT YOU FROM MEDICAL APPOINTMENTS OR FROM GETTING MEDICATIONS?: PATIENT DECLINED

## 2020-06-06 ASSESSMENT — PATIENT HEALTH QUESTIONNAIRE - PHQ9
4. FEELING TIRED OR HAVING LITTLE ENERGY: NOT AT ALL
SUM OF ALL RESPONSES TO PHQ QUESTIONS 1-9: 2
SUM OF ALL RESPONSES TO PHQ9 QUESTIONS 1 AND 2: 1
5. POOR APPETITE OR OVEREATING: NOT AT ALL
3. TROUBLE FALLING OR STAYING ASLEEP OR SLEEPING TOO MUCH: NOT AT ALL
1. LITTLE INTEREST OR PLEASURE IN DOING THINGS: NOT AT ALL
8. MOVING OR SPEAKING SO SLOWLY THAT OTHER PEOPLE COULD HAVE NOTICED. OR THE OPPOSITE, BEING SO FIGETY OR RESTLESS THAT YOU HAVE BEEN MOVING AROUND A LOT MORE THAN USUAL: NOT AT ALL
6. FEELING BAD ABOUT YOURSELF - OR THAT YOU ARE A FAILURE OR HAVE LET YOURSELF OR YOUR FAMILY DOWN: SEVERAL DAYS
2. FEELING DOWN, DEPRESSED, IRRITABLE, OR HOPELESS: SEVERAL DAYS
7. TROUBLE CONCENTRATING ON THINGS, SUCH AS READING THE NEWSPAPER OR WATCHING TELEVISION: NOT AT ALL
9. THOUGHTS THAT YOU WOULD BE BETTER OFF DEAD, OR OF HURTING YOURSELF: NOT AT ALL

## 2020-06-06 ASSESSMENT — FIBROSIS 4 INDEX
FIB4 SCORE: 0.67
FIB4 SCORE: 0.69

## 2020-06-06 ASSESSMENT — LIFESTYLE VARIABLES: EVER_SMOKED: NEVER

## 2020-06-07 ENCOUNTER — ANESTHESIA (OUTPATIENT)
Dept: ANESTHESIOLOGY | Facility: MEDICAL CENTER | Age: 35
End: 2020-06-07
Payer: MEDICAID

## 2020-06-07 ENCOUNTER — ANESTHESIA EVENT (OUTPATIENT)
Dept: ANESTHESIOLOGY | Facility: MEDICAL CENTER | Age: 35
End: 2020-06-07
Payer: MEDICAID

## 2020-06-07 LAB
ABO GROUP BLD: NORMAL
AMPHET CTO UR CFM-MCNC: NEGATIVE NG/ML
AMPHET UR QL SCN: NEGATIVE
BARBITURATES CTO UR CFM-MCNC: NEGATIVE NG/ML
BARBITURATES UR QL SCN: NEGATIVE
BENZODIAZ CTO UR CFM-MCNC: NEGATIVE NG/ML
BENZODIAZ UR QL SCN: NEGATIVE
BLD GP AB SCN SERPL QL: NORMAL
BZE UR QL SCN: NEGATIVE
CANNABINOIDS CTO UR CFM-MCNC: NEGATIVE NG/ML
CANNABINOIDS UR QL SCN: NEGATIVE
COCAINE CTO UR CFM-MCNC: NEGATIVE NG/ML
DRUG COMMENT 753798: NORMAL
GP B STREP DNA SPEC QL NAA+PROBE: NEGATIVE
METHADONE CTO UR CFM-MCNC: NEGATIVE NG/ML
METHADONE UR QL SCN: NEGATIVE
OPIATES CTO UR CFM-MCNC: NEGATIVE NG/ML
OPIATES UR QL SCN: NEGATIVE
OXYCODONE UR QL SCN: NEGATIVE
PCP CTO UR CFM-MCNC: NEGATIVE NG/ML
PCP UR QL SCN: NEGATIVE
PROPOXYPH CTO UR CFM-MCNC: NEGATIVE NG/ML
PROPOXYPH UR QL SCN: NEGATIVE
RH BLD: NORMAL

## 2020-06-07 PROCEDURE — 304965 HCHG RECOVERY SERVICES

## 2020-06-07 PROCEDURE — 700101 HCHG RX REV CODE 250: Performed by: ANESTHESIOLOGY

## 2020-06-07 PROCEDURE — 700111 HCHG RX REV CODE 636 W/ 250 OVERRIDE (IP): Performed by: NURSE PRACTITIONER

## 2020-06-07 PROCEDURE — 700102 HCHG RX REV CODE 250 W/ 637 OVERRIDE(OP): Performed by: STUDENT IN AN ORGANIZED HEALTH CARE EDUCATION/TRAINING PROGRAM

## 2020-06-07 PROCEDURE — 700102 HCHG RX REV CODE 250 W/ 637 OVERRIDE(OP): Performed by: NURSE PRACTITIONER

## 2020-06-07 PROCEDURE — 700105 HCHG RX REV CODE 258: Performed by: OBSTETRICS & GYNECOLOGY

## 2020-06-07 PROCEDURE — A9270 NON-COVERED ITEM OR SERVICE: HCPCS | Performed by: NURSE PRACTITIONER

## 2020-06-07 PROCEDURE — 10D17Z9 MANUAL EXTRACTION OF PRODUCTS OF CONCEPTION, RETAINED, VIA NATURAL OR ARTIFICIAL OPENING: ICD-10-PCS | Performed by: OBSTETRICS & GYNECOLOGY

## 2020-06-07 PROCEDURE — 59410 OBSTETRICAL CARE: CPT | Performed by: OBSTETRICS & GYNECOLOGY

## 2020-06-07 PROCEDURE — 700111 HCHG RX REV CODE 636 W/ 250 OVERRIDE (IP): Performed by: OBSTETRICS & GYNECOLOGY

## 2020-06-07 PROCEDURE — 770002 HCHG ROOM/CARE - OB PRIVATE (112)

## 2020-06-07 PROCEDURE — 303615 HCHG EPIDURAL/SPINAL ANESTHESIA FOR LABOR

## 2020-06-07 PROCEDURE — 700111 HCHG RX REV CODE 636 W/ 250 OVERRIDE (IP)

## 2020-06-07 PROCEDURE — A9270 NON-COVERED ITEM OR SERVICE: HCPCS | Performed by: STUDENT IN AN ORGANIZED HEALTH CARE EDUCATION/TRAINING PROGRAM

## 2020-06-07 PROCEDURE — 59409 OBSTETRICAL CARE: CPT

## 2020-06-07 PROCEDURE — 700111 HCHG RX REV CODE 636 W/ 250 OVERRIDE (IP): Performed by: ANESTHESIOLOGY

## 2020-06-07 RX ORDER — HALOPERIDOL 5 MG/ML
1 INJECTION INTRAMUSCULAR
Status: CANCELLED | OUTPATIENT
Start: 2020-06-07

## 2020-06-07 RX ORDER — ROPIVACAINE HYDROCHLORIDE 2 MG/ML
INJECTION, SOLUTION EPIDURAL; INFILTRATION
Status: COMPLETED | OUTPATIENT
Start: 2020-06-07 | End: 2020-06-07

## 2020-06-07 RX ORDER — LIDOCAINE HYDROCHLORIDE AND EPINEPHRINE 15; 5 MG/ML; UG/ML
INJECTION, SOLUTION EPIDURAL
Status: COMPLETED | OUTPATIENT
Start: 2020-06-07 | End: 2020-06-07

## 2020-06-07 RX ORDER — IBUPROFEN 800 MG/1
800 TABLET ORAL EVERY 8 HOURS PRN
Status: DISCONTINUED | OUTPATIENT
Start: 2020-06-07 | End: 2020-06-08 | Stop reason: HOSPADM

## 2020-06-07 RX ORDER — VITAMIN A ACETATE, BETA CAROTENE, ASCORBIC ACID, CHOLECALCIFEROL, .ALPHA.-TOCOPHEROL ACETATE, DL-, THIAMINE MONONITRATE, RIBOFLAVIN, NIACINAMIDE, PYRIDOXINE HYDROCHLORIDE, FOLIC ACID, CYANOCOBALAMIN, CALCIUM CARBONATE, FERROUS FUMARATE, ZINC OXIDE, CUPRIC OXIDE 3080; 12; 120; 400; 1; 1.84; 3; 20; 22; 920; 25; 200; 27; 10; 2 [IU]/1; UG/1; MG/1; [IU]/1; MG/1; MG/1; MG/1; MG/1; MG/1; [IU]/1; MG/1; MG/1; MG/1; MG/1; MG/1
1 TABLET, FILM COATED ORAL EVERY MORNING
Status: DISCONTINUED | OUTPATIENT
Start: 2020-06-08 | End: 2020-06-08 | Stop reason: HOSPADM

## 2020-06-07 RX ORDER — DOCUSATE SODIUM 100 MG/1
100 CAPSULE, LIQUID FILLED ORAL 2 TIMES DAILY PRN
Status: DISCONTINUED | OUTPATIENT
Start: 2020-06-07 | End: 2020-06-08 | Stop reason: HOSPADM

## 2020-06-07 RX ORDER — SODIUM CHLORIDE, SODIUM LACTATE, POTASSIUM CHLORIDE, CALCIUM CHLORIDE 600; 310; 30; 20 MG/100ML; MG/100ML; MG/100ML; MG/100ML
INJECTION, SOLUTION INTRAVENOUS CONTINUOUS
Status: DISCONTINUED | OUTPATIENT
Start: 2020-06-07 | End: 2020-06-08 | Stop reason: HOSPADM

## 2020-06-07 RX ORDER — DIPHENHYDRAMINE HYDROCHLORIDE 50 MG/ML
12.5 INJECTION INTRAMUSCULAR; INTRAVENOUS
Status: CANCELLED | OUTPATIENT
Start: 2020-06-07

## 2020-06-07 RX ORDER — IBUPROFEN 800 MG/1
800 TABLET ORAL 3 TIMES DAILY PRN
Status: DISCONTINUED | OUTPATIENT
Start: 2020-06-07 | End: 2020-06-08

## 2020-06-07 RX ORDER — SODIUM CHLORIDE, SODIUM LACTATE, POTASSIUM CHLORIDE, CALCIUM CHLORIDE 600; 310; 30; 20 MG/100ML; MG/100ML; MG/100ML; MG/100ML
INJECTION, SOLUTION INTRAVENOUS PRN
Status: DISCONTINUED | OUTPATIENT
Start: 2020-06-07 | End: 2020-06-08 | Stop reason: HOSPADM

## 2020-06-07 RX ORDER — METOCLOPRAMIDE HYDROCHLORIDE 5 MG/ML
10 INJECTION INTRAMUSCULAR; INTRAVENOUS EVERY 6 HOURS PRN
Status: DISCONTINUED | OUTPATIENT
Start: 2020-06-07 | End: 2020-06-08 | Stop reason: HOSPADM

## 2020-06-07 RX ORDER — METHYLERGONOVINE MALEATE 0.2 MG/ML
INJECTION INTRAVENOUS
Status: ACTIVE
Start: 2020-06-07 | End: 2020-06-08

## 2020-06-07 RX ORDER — ACETAMINOPHEN 500 MG
1000 TABLET ORAL 3 TIMES DAILY PRN
Status: DISCONTINUED | OUTPATIENT
Start: 2020-06-07 | End: 2020-06-08 | Stop reason: HOSPADM

## 2020-06-07 RX ORDER — ACETAMINOPHEN 325 MG/1
650 TABLET ORAL EVERY 4 HOURS PRN
Status: DISCONTINUED | OUTPATIENT
Start: 2020-06-07 | End: 2020-06-08 | Stop reason: HOSPADM

## 2020-06-07 RX ORDER — ROPIVACAINE HYDROCHLORIDE 2 MG/ML
INJECTION, SOLUTION EPIDURAL; INFILTRATION; PERINEURAL
Status: COMPLETED
Start: 2020-06-07 | End: 2020-06-07

## 2020-06-07 RX ORDER — ONDANSETRON 2 MG/ML
4 INJECTION INTRAMUSCULAR; INTRAVENOUS
Status: CANCELLED | OUTPATIENT
Start: 2020-06-07

## 2020-06-07 RX ADMIN — OXYTOCIN 20 UNITS: 10 INJECTION, SOLUTION INTRAMUSCULAR; INTRAVENOUS at 13:07

## 2020-06-07 RX ADMIN — SODIUM CHLORIDE, POTASSIUM CHLORIDE, SODIUM LACTATE AND CALCIUM CHLORIDE 1000 ML: 600; 310; 30; 20 INJECTION, SOLUTION INTRAVENOUS at 10:38

## 2020-06-07 RX ADMIN — ROPIVACAINE HYDROCHLORIDE 8 ML: 2 INJECTION, SOLUTION EPIDURAL; INFILTRATION at 11:01

## 2020-06-07 RX ADMIN — OXYTOCIN 125 ML/HR: 10 INJECTION, SOLUTION INTRAMUSCULAR; INTRAVENOUS at 13:40

## 2020-06-07 RX ADMIN — SODIUM CHLORIDE 3 G: 9 INJECTION, SOLUTION INTRAVENOUS at 16:02

## 2020-06-07 RX ADMIN — MISOPROSTOL 25 MCG: 100 TABLET ORAL at 04:12

## 2020-06-07 RX ADMIN — IBUPROFEN 800 MG: 800 TABLET, FILM COATED ORAL at 15:49

## 2020-06-07 RX ADMIN — MISOPROSTOL 800 MCG: 200 TABLET ORAL at 13:07

## 2020-06-07 RX ADMIN — LIDOCAINE HYDROCHLORIDE,EPINEPHRINE BITARTRATE 3 ML: 15; .005 INJECTION, SOLUTION EPIDURAL; INFILTRATION; INTRACAUDAL; PERINEURAL at 11:01

## 2020-06-07 RX ADMIN — ROPIVACAINE HYDROCHLORIDE 200 MG: 2 INJECTION, SOLUTION EPIDURAL; INFILTRATION at 10:38

## 2020-06-07 RX ADMIN — METHYLERGONOVINE MALEATE 0.2 MG: 0.2 INJECTION, SOLUTION INTRAMUSCULAR; INTRAVENOUS at 13:08

## 2020-06-07 RX ADMIN — SODIUM CHLORIDE, POTASSIUM CHLORIDE, SODIUM LACTATE AND CALCIUM CHLORIDE 1000 ML: 600; 310; 30; 20 INJECTION, SOLUTION INTRAVENOUS at 11:42

## 2020-06-07 RX ADMIN — ACETAMINOPHEN 1000 MG: 500 TABLET ORAL at 20:47

## 2020-06-07 RX ADMIN — SODIUM CHLORIDE, POTASSIUM CHLORIDE, SODIUM LACTATE AND CALCIUM CHLORIDE 1000 ML: 600; 310; 30; 20 INJECTION, SOLUTION INTRAVENOUS at 10:00

## 2020-06-07 RX ADMIN — FAMOTIDINE 20 MG: 10 INJECTION, SOLUTION INTRAVENOUS at 01:15

## 2020-06-07 RX ADMIN — OXYTOCIN 2000 ML/HR: 10 INJECTION, SOLUTION INTRAMUSCULAR; INTRAVENOUS at 13:08

## 2020-06-07 ASSESSMENT — EDINBURGH POSTNATAL DEPRESSION SCALE (EPDS)
I HAVE BEEN ABLE TO LAUGH AND SEE THE FUNNY SIDE OF THINGS: AS MUCH AS I ALWAYS COULD
THE THOUGHT OF HARMING MYSELF HAS OCCURRED TO ME: NEVER
I HAVE FELT SAD OR MISERABLE: NOT VERY OFTEN
I HAVE BLAMED MYSELF UNNECESSARILY WHEN THINGS WENT WRONG: YES, SOME OF THE TIME
I HAVE FELT SCARED OR PANICKY FOR NO GOOD REASON: NO, NOT MUCH
I HAVE BEEN ANXIOUS OR WORRIED FOR NO GOOD REASON: NO, NOT AT ALL
I HAVE BEEN SO UNHAPPY THAT I HAVE BEEN CRYING: ONLY OCCASIONALLY
THINGS HAVE BEEN GETTING ON TOP OF ME: NO, MOST OF THE TIME I HAVE COPED QUITE WELL
I HAVE BEEN SO UNHAPPY THAT I HAVE HAD DIFFICULTY SLEEPING: NOT AT ALL
I HAVE LOOKED FORWARD WITH ENJOYMENT TO THINGS: AS MUCH AS I EVER DID

## 2020-06-07 NOTE — PROGRESS NOTES
"0700 - Report received from Judy MOREAU RN, care assumed. Pierre Gestation 39.1 weeks    Patient is sitting up in bed. Reports occasional contraction discomfort low and center in the abdomen. States her biggest discomfort is her current position and having her friend hold the monitor. Fetal monitoring has been a challenge all night with the RN at  adjusting and holding the monitor. The previous RN/CNM agreed to obtain a reactive NST and allow the patient to rest. Recent NST from 0630 - 0700 was reactive. The EMs have been removed to allow the patient to rest until 830. This plan has been confirmed with Patricia ALVARADO CNM.     The FOB is not involved.   Patients friend \"Eden\" is at the BS now but plans to go to work for a couple hours.     Previous RN reports that patient is thinking about giving the baby up for adoption. Stating the patient was distressed on arrival last night revealing that her other 5 children are asking her not to give this baby up for adoption. Per the previous RN, Patient reported that she has the financial means, but the FOB is not involved and she is not sure she is capable or wanting to be a single mother to an infant again. The decision is further complicated because the woman that the baby was going to be adopted out to is nice and the patient has developed a relationship with her and feels obligated to give her the NB. The previous RN reports that patient states she was working with an adoption agency.  consult has been ordered. This RN will offer support and discuss the matter further with the patient at the appropriate time.     Patient is currently under an alias, patient reports she does not want all the friends and family to call her and add extra pressure to her already complex decision.     Patient is uncertain how delivery will go, states she may change her mind at the last minute however she thinks that -   Patient would like her friend to be at  during the " "pushing and delivery phase.  Patient would like immediate skin/skin at delivery.   Once the cord has stopped pulsating, Eden would like to cut the cord.   Contemplating Breastfeeding    Reports little sleep last night, denies ill feeling, reports FM, denies ROM or Bleeding, No change to vision/edema/HA; states she has been getting up to the BR herself without assist, denies dizziness or weakness.     Use of FM/[place] discussed - not in place at this time. Discussed POC, pleasant affect/mood. Review of labor process/expectations, breathing/relaxation techniques - TBD as needed. Discussed pain management options - patient is planning to request an epidural. Patient given an Epidural consent to review prior to hard labor.     Patient denies questions/concerns regarding care since arrival to [facility].   RN contact information updated on the dry erase board, discussed.   Patient encouraged to call RN with all questions/concerns/needs.    1301 -  of female by Dr. LUZ MARIA Ortez and Dr. Angulo. Dennise HEALY RN at  to care for NB. Patients friend \"Eden\" at BS. NB to abdomen and briefly to s/s. Patient felt nausea and weak and asked that the baby be wrapped.     1430 - Patient is able to  her lower extremities, reports her thighs and pelvis feel numb and heavy.  is at the BS to discuss plan regarding adoption.     "

## 2020-06-07 NOTE — DISCHARGE PLANNING
"Completed chart review and discussed with RN. Patient considering adoption and feeling conflicted.     Met with patient. She was considering adoption and contacted [employer] to make arrangement. She chose an adopt mother and has gotten to know her. Patient feels guilty that she is considering not adopting infant to this person she has developed a friendship with. Patient considered adoption because father is not currently involved and she has 5 other children. She is not concerned about finances/resources to care for infant. Discussed how she is feeling emotionally now that infant is born. Mother feels a connection to infant. She has talked to her 15 year old son who has told her he hopes she will bring infant home and \"keep the baby.\"  Mother has friend at bedside who is her support person.     Discussed her feeling of guilt. Encouraged mother to have self compassion when thinking through her decision and feelings of guilt. We talked about that she refers to her plan to adopt in the past tense and mother acknowledges her desire to parent infant. Provided active listening and empathetic support. Mother would like time tonight and to meet again in the morning. Offered to notify [employer] tomorrow and discussed that she will likely want/need to talk to them herself at some point. Patient in agreement.     Will follow with patient in the morning and continue to discuss plans to notify adoption agency if her plans to parent infant.   "

## 2020-06-07 NOTE — L&D DELIVERY NOTE
SPONTANEOUS VAGINAL DELIVERY PROCEDURE NOTE    PATIENT ID:  NAME:  Elizabeth Banks  MRN:               [id number]  YOB: 1985    Labor Course: Patient was admitted to Labor and Delivery at 39w1d for active labor.      On 2020  at 1302, this 34 y.o., now  39w1d , GBS negative female delivered via  under epidural anesthesia a viable female infant weighing 8 lbs and 6.4 oz with APGAR scores of 8 and 8 at one and five minutes. There was no nuchal cord and infant was bulb suctioned at delivery. Cord was doubly clamped, cut and infant handed to RN in attendance.     Patient had a retained placenta which was manually extracted by Dr. Angulo    Upon vaginal exam, there were mild abrasions but no laceration which required repair. Estimated blood loss was 200cc. Patient will be transferred to postpartum in stable condition and infant to  nursery.      Delivery attended by Murray Angulo MD

## 2020-06-07 NOTE — PROGRESS NOTES
LABOR AND DELIVERY PROGRESS NOTE    PATIENT ID:  NAME:  Elizabeth Banks  MRN:               [id number]  YOB: 1985     34 y.o. female  at 39w1d.    Subjective: Patient had her epidural placed and is more comfortable. Still feeling some pain with contractions    positive  For CTXS.   negative for LOF  negative for vaginal bleeding.   positive for fetal movement    Objective:    Vitals:    20 2216 20 2220 20 2221 20 0600   BP:    124/57   Pulse: (!) 104 (!) 106 (!) 103 88   Resp:    20   Temp:    36.8 °C (98.2 °F)   TempSrc:    Axillary   SpO2: 97%  98%    Weight:       Height:           Cervix:  5cm/60%/-3  [place]: Uterine Contractions Q 3-4 minutes.  FHRM: Baseline 145, Accels to 170, moderate variability  Pain control: Epidural    Assessment: 34 y.o. female  at 39w1d.    Plan:   1. Continue current management  2. Anticipate vaginal deliery

## 2020-06-07 NOTE — ANESTHESIA PROCEDURE NOTES
Epidural Block    Date/Time: 6/7/2020 11:01 AM  Performed by: Davis Webb M.D.  Authorized by: Davis Webb M.D.     Patient Location:  OB  Start Time:  6/7/2020 11:01 AM  Reason for Block: labor analgesia    patient identified, IV checked, site marked, risks and benefits discussed, surgical consent, monitors and equipment checked, pre-op evaluation and timeout performed    Patient Position:  Sitting  Prep: ChloraPrep, patient draped and sterile technique    Monitoring:  Blood pressure, continuous pulse oximetry and heart rate  Approach:  Midline  Location:  L3-L4  Injection Technique:  MADDISON saline  Skin infiltration:  Lidocaine  Strength:  1%  Dose:  3ml  Needle Type:  Tuohy  Needle Gauge:  17 G  Needle Length:  3.5 in  Loss of resistance::  7  Catheter Size:  19 G  Catheter at Skin Depth:  12

## 2020-06-07 NOTE — ANESTHESIA PREPROCEDURE EVALUATION
Relevant Problems   No relevant active problems       Physical Exam    Airway   Mallampati: II  TM distance: >3 FB  Neck ROM: full       Cardiovascular - normal exam  Rhythm: regular  Rate: normal  (-) murmur     Dental - normal exam           Pulmonary - normal exam  Breath sounds clear to auscultation     Abdominal    Neurological - normal exam                 Anesthesia Plan    ASA 4       Plan - epidural   Neuraxial block will be labor analgesia  (Life threatening morbid obesity)            Pertinent diagnostic labs and testing reviewed    Informed Consent:    Anesthetic plan and risks discussed with patient.

## 2020-06-08 VITALS
HEART RATE: 88 BPM | BODY MASS INDEX: 41.02 KG/M2 | DIASTOLIC BLOOD PRESSURE: 66 MMHG | HEIGHT: 71 IN | WEIGHT: 293 LBS | OXYGEN SATURATION: 96 % | RESPIRATION RATE: 17 BRPM | TEMPERATURE: 98.4 F | SYSTOLIC BLOOD PRESSURE: 136 MMHG

## 2020-06-08 LAB
ERYTHROCYTE [DISTWIDTH] IN BLOOD BY AUTOMATED COUNT: 49.1 FL (ref 35.9–50)
GP B STREP DNA SPEC QL NAA+PROBE: NEGATIVE
HCT VFR BLD AUTO: 24.4 % (ref 37–47)
HGB BLD-MCNC: 7 G/DL (ref 12–16)
MCH RBC QN AUTO: 21.7 PG (ref 27–33)
MCHC RBC AUTO-ENTMCNC: 28.7 G/DL (ref 33.6–35)
MCV RBC AUTO: 75.5 FL (ref 81.4–97.8)
PLATELET # BLD AUTO: 219 K/UL (ref 164–446)
PMV BLD AUTO: 11.3 FL (ref 9–12.9)
RBC # BLD AUTO: 3.23 M/UL (ref 4.2–5.4)
WBC # BLD AUTO: 13.9 K/UL (ref 4.8–10.8)

## 2020-06-08 PROCEDURE — 700102 HCHG RX REV CODE 250 W/ 637 OVERRIDE(OP): Performed by: STUDENT IN AN ORGANIZED HEALTH CARE EDUCATION/TRAINING PROGRAM

## 2020-06-08 PROCEDURE — 700102 HCHG RX REV CODE 250 W/ 637 OVERRIDE(OP): Performed by: NURSE PRACTITIONER

## 2020-06-08 PROCEDURE — A9270 NON-COVERED ITEM OR SERVICE: HCPCS | Performed by: STUDENT IN AN ORGANIZED HEALTH CARE EDUCATION/TRAINING PROGRAM

## 2020-06-08 PROCEDURE — A9270 NON-COVERED ITEM OR SERVICE: HCPCS | Performed by: NURSE PRACTITIONER

## 2020-06-08 PROCEDURE — 85027 COMPLETE CBC AUTOMATED: CPT

## 2020-06-08 PROCEDURE — 36415 COLL VENOUS BLD VENIPUNCTURE: CPT

## 2020-06-08 RX ORDER — PSEUDOEPHEDRINE HCL 30 MG
100 TABLET ORAL 2 TIMES DAILY PRN
Qty: 60 CAP | Refills: 0 | Status: SHIPPED | OUTPATIENT
Start: 2020-06-08

## 2020-06-08 RX ORDER — IBUPROFEN 800 MG/1
800 TABLET ORAL EVERY 8 HOURS PRN
Qty: 30 TAB | Refills: 0 | Status: SHIPPED | OUTPATIENT
Start: 2020-06-08

## 2020-06-08 RX ORDER — FERROUS SULFATE 325(65) MG
325 TABLET ORAL DAILY
Qty: 30 TAB | Refills: 0 | Status: SHIPPED | OUTPATIENT
Start: 2020-06-08

## 2020-06-08 RX ADMIN — PRENATAL WITH FERROUS FUM AND FOLIC ACID 1 TABLET: 3080; 920; 120; 400; 22; 1.84; 3; 20; 10; 1; 12; 200; 27; 25; 2 TABLET ORAL at 08:24

## 2020-06-08 RX ADMIN — IBUPROFEN 800 MG: 800 TABLET, FILM COATED ORAL at 01:57

## 2020-06-08 RX ADMIN — ACETAMINOPHEN 1000 MG: 500 TABLET ORAL at 08:23

## 2020-06-08 NOTE — ANESTHESIA POSTPROCEDURE EVALUATION
Patient: [facility]    Procedure Summary     Date:  06/07/20 Room / Location:      Anesthesia Start:  1030 Anesthesia Stop:  1301    Procedure:  Labor Epidural Diagnosis:      Scheduled Providers:   Responsible Provider:  Davis Webb M.D.    Anesthesia Type:  epidural ASA Status:  4          Final Anesthesia Type: epidural  Last vitals  BP   Blood Pressure: 128/67    Temp   36.7 °C (98 °F)    Pulse   Pulse: 82   Resp   18    SpO2   98 %      Anesthesia Post Evaluation    Patient location during evaluation: PACU  Patient participation: complete - patient participated  Level of consciousness: awake and alert    Airway patency: patent  Anesthetic complications: no  Cardiovascular status: hemodynamically stable  Respiratory status: acceptable  Hydration status: euvolemic    PONV: none           Nurse Pain Score: 4 (NPRS)

## 2020-06-08 NOTE — DISCHARGE SUMMARY
Discharge Summary:      [facility]    Admit Date:   2020  Discharge Date:  2020     Admitting diagnosis:  Pregnancy  Indication for care in labor or delivery  Discharge Diagnosis: Status post vaginal, spontaneous.  Pregnancy Complications: anemia, obesity, hx macrosomia  Tubal Ligation:  no        History:  Past Medical History:   Diagnosis Date   • Anemia      OB History    Para Term  AB Living   6 6 6     6   SAB TAB Ectopic Molar Multiple Live Births           0 6      # Outcome Date GA Lbr Cody/2nd Weight Sex Delivery Anes PTL Lv   6 Term 20 39w1d / 00:16 3.81 kg (8 lb 6.4 oz) F Vag-Spont EPI N JESSIKA   5 Term 17 40w0d  2.778 kg (6 lb 2 oz) M Vag-Spont  N JESSIKA      Birth Comments: pt states no complications   4 Term 07/15/16 40w0d  3.515 kg (7 lb 12 oz) F Vag-Spont  N JESSIKA      Birth Comments: pt states no complications   3 Term 09/10/12 40w0d  3.289 kg (7 lb 4 oz) F Vag-Spont EPI N JESSIKA   2 Term 06 40w0d  4.791 kg (10 lb 9 oz) M Vag-Spont EPI N JESSIKA   1 Term 05 40w0d  3.856 kg (8 lb 8 oz) M Vag-Spont EPI N JESSIKA        Bactrim ds and Tramadol  Patient Active Problem List    Diagnosis Date Noted   • Morbid obesity with BMI of 40.0-44.9, adult ([facility]) 2020   • Anemia affecting pregnancy 2016   • Encounter for supervision of normal pregnancy 2016   • History of macrosomia in infant in prior pregnancy, currently pregnant in third trimester 2016   • Infected dental carries 2016        Hospital Course:   34 y.o. , now para 6, was admitted with the above mentioned diagnosis, underwent Active Labor, vaginal, spontaneous. Patient postpartum course was unremarkable, with progressive advancement in diet , ambulation and toleration of oral analgesia. Patient without complaints today and desires discharge.      Vitals:    20 1500 20 1800 20 2200 20 0600   BP:  134/72 125/62 136/66   Pulse:  88 84 88   Resp: 18 18 18  17   Temp: 36.7 °C (98 °F) 36.1 °C (96.9 °F) 37.1 °C (98.8 °F) 36.9 °C (98.4 °F)   TempSrc: Temporal Temporal Temporal Temporal   SpO2:  96% 98% 96%   Weight:       Height:           Current Facility-Administered Medications   Medication Dose   • metoclopramide (REGLAN) injection 10 mg  10 mg   • oxytocin (PITOCIN) infusion (for postpartum)   mL/hr   • ibuprofen (MOTRIN) tablet 800 mg  800 mg   • acetaminophen (TYLENOL) tablet 650 mg  650 mg   • lactated ringers infusion     • LR infusion     • PRN oxytocin (PITOCIN) (20 Units/1000 mL) PRN for excessive uterine bleeding - See Admin Instr  125-999 mL/hr   • docusate sodium (COLACE) capsule 100 mg  100 mg   • prenatal plus vitamin (STUARTNATAL 1+1) 27-1 MG tablet 1 Tab  1 Tab   • acetaminophen (TYLENOL) tablet 1,000 mg  1,000 mg   • D5LR infusion         Exam:  Breast Exam: negative  Abdomen: Abdomen soft, non-tender. BS normal. No masses,  No organomegaly  Fundus Non Tender: yes  Incision: none  Perineum: perineum intact  Extremity: extremities, peripheral pulses and reflexes normal     Labs:  Recent Labs     06/05/20  1554 06/06/20  2225 06/08/20  0456   WBC 13.4* 13.7* 13.9*   RBC 3.85* 3.67* 3.23*   HEMOGLOBIN 8.2* 7.9* 7.0*   HEMATOCRIT 28.3* 27.0* 24.4*   MCV 73.5* 73.6* 75.5*   MCH 21.3* 21.5* 21.7*   MCHC 29.0* 29.3* 28.7*   RDW 47.1 48.0 49.1   PLATELETCT 259 268 219   MPV 10.9 11.1 11.3        Activity:   Discharge to home  Pelvic Rest x 6 weeks    Assessment:  normal postpartum course  Discharge Assessment: No areas of skin breakdown/redness; surgical incision intact/healing     Follow up: .[facility] or [facility] in 5 weeks for vaginal ; 1 week for incision check. Pt has asymptomatic anemia, d/w pt blood transfusion, though pt declines now. Pt had retained placenta with manual removal, so d/w pt risks of infection and signs to look for. Will send rx for FeSO4 today.     Discharge Meds:   Current Outpatient Medications   Medication Sig Dispense  Refill   • docusate sodium 100 MG Cap Take 100 mg by mouth 2 times a day as needed for Constipation. 60 Cap 0   • ibuprofen (MOTRIN) 800 MG Tab Take 1 Tab by mouth every 8 hours as needed (For cramping after delivery; do not give if patient is receiving ketorolac (Toradol)). 30 Tab 0   • ferrous sulfate 325 (65 Fe) MG tablet Take 1 Tab by mouth every day. 30 Tab 0       Loco Hernandez, P.A.

## 2020-06-08 NOTE — DISCHARGE INSTRUCTIONS
POSTPARTUM DISCHARGE INSTRUCTIONS FOR MOM    YOB: 1985   Age: 34 y.o.               Admit Date: 2020     Discharge Date: 2020  Attending Doctor:  Shweta Woody M.D.                  Allergies:  Bactrim ds and Tramadol    Discharged to home by car. Discharged via wheelchair, hospital escort: Yes.  Special equipment needed: Not Applicable  Belongings with: Personal  Be sure to schedule a follow-up appointment with your primary care doctor or any specialists as instructed.     Discharge Plan:   Diet Plan: Discussed  Activity Level: Discussed  Confirmed Follow up Appointment: Patient to Call and Schedule Appointment  Confirmed Symptoms Management: Discussed  Medication Reconciliation Updated: Yes    REASONS TO CALL YOUR OBSTETRICIAN:  1.   Persistent fever or shaking chills (Temperature higher than 100.4)  2.   Heavy bleeding (soaking more than 1 pad per hour); Passing clots  3.   Foul odor from vagina  4.   Mastitis (Breast infection; breast pain, chills, fever, redness)  5.   Urinary pain, burning or frequency  6.   Episiotomy infection  7.   Abdominal incision infection  8.   Severe depression longer than 24 hours    HAND WASHING  · Prior to handling the baby.  · Before breastfeeding or bottle feeding baby.  · After using the bathroom or changing the baby's diaper.    WOUND CARE  Ask your physician for additional care instructions.  In general:    ·  Incision:      · Keep clean and dry.    · Do NOT lift anything heavier than your baby for up to 6 weeks.    · There should not be any opening or pus.      VAGINAL CARE  · Nothing inside vagina for 6 weeks: no sexual intercourse, tampons or douching.  · Bleeding may continue for 2-4 weeks.  Amount may vary.    · Call your physician for heavy bleeding which means soaking more than 1 pad per hour    BIRTH CONTROL  · It is possible to become pregnant at any time after delivery and while breastfeeding.  · Plan to discuss a method of birth control  "with your physician at your follow up visit. visit.    DIET AND ELIMINATION  · Eating more fiber (bran cereal, fruits, and vegetables) and drinking plenty of fluids will help to avoid constipation.  · Urinary frequency after childbirth is normal.    POSTPARTUM BLUES  During the first few days after birth, you may experience a sense of the \"blues\" which may include impatience, irritability or even crying.  These feeling come and go quickly.  However, as many as 1 in 10 women experience emotional symptoms known as postpartum depression.    Postpartum depression:  May start as early as the second or third day after delivery or take several weeks or months to develop.  Symptoms of \"blues\" are present, but are more intense:  Crying spells; loss of appetite; feelings of hopelessness or loss of control; fear of touching the baby; over concern or no concern at all about the baby; little or no concern about your own appearance/caring for yourself; and/or inability to sleep or excessive sleeping.  Contact your physician if you are experiencing any of these symptoms.    Crisis Hotline:  · [place] Crisis Hotline:  2-170-ABDOZES  Or 1-288.440.8436  · [place] Crisis Hotline:  1-509.993.8948  Or 912-997-0089    PREVENTING SHAKEN BABY:  If you are angry or stressed, PUT THE BABY IN THE CRIB, step away, take some deep breaths, and wait until you are calm to care for the baby.  DO NOT SHAKE THE BABY.  You are not alone, call a supporter for help.    · Crisis Call Center 24/7 crisis line 326-967-8227 or 1-686.371.4261  · You can also text them, text \"ANSWER\" to 303499    QUIT SMOKING/TOBACCO USE:  I understand the use of any tobacco products increases my chance of suffering from future heart disease and could cause other illnesses which may shorten my life. Quitting the use of tobacco products is the single most important thing I can do to improve my health. For further information on smoking / tobacco cessation call a Toll Free Quit " Line at 1-636.128.9184 (*National [employer] [place]) or 1-162.115.8272 (American Lung Association) or you can access the web based program at www.lungusa.org.    · [place] Tobacco Users Help Line:  (237) 983-3877       Toll Free: 1-235.836.1457  · Quit Tobacco Program [facility] (486)295-7682    DEPRESSION / SUICIDE RISK:  As you are discharged from this [facility], it is important to learn how to keep safe from harming yourself.    Recognize the warning signs:  · Abrupt changes in personality, positive or negative- including increase in energy   · Giving away possessions  · Change in eating patterns- significant weight changes-  positive or negative  · Change in sleeping patterns- unable to sleep or sleeping all the time   · Unwillingness or inability to communicate  · Depression  · Unusual sadness, discouragement and loneliness  · Talk of wanting to die  · Neglect of personal appearance   · Rebelliousness- reckless behavior  · Withdrawal from people/activities they love  · Confusion- inability to concentrate     If you or a loved one observes any of these behaviors or has concerns about self-harm, here's what you can do:  · Talk about it- your feelings and reasons for harming yourself  · Remove any means that you might use to hurt yourself (examples: pills, rope, extension cords, firearm)  · Get professional help from the community (Mental Health, Substance Abuse, psychological counseling)  · Do not be alone:Call your Safe Contact- someone whom you trust who will be there for you.  · Call your local CRISIS HOTLINE 599-3286 or 772-390-1205  · Call your local Children's Mobile Crisis Response Team [place] (809) 725-1604 or www.[employer]  · Call the toll free National Suicide Prevention Hotlines   · National Suicide Prevention Lifeline 696-687-SIIN (8326)  · National Hope Line Network 800-SUICIDE (495-4248)    DISCHARGE SURVEY:  Thank you for choosing [facility].  We  hope we provided you with very good care.  You may be receiving a survey in the mail.  Please fill it out.  Your opinion is valuable to us.    ADDITIONAL EDUCATIONAL MATERIALS GIVEN TO PATIENT:        My signature on this form indicates that:  1.  I have reviewed and understand the above information  2.  My questions regarding this information have been answered to my satisfaction.  3.  I have formulated a plan with my discharge nurse to obtain my prescribed medication for home.

## 2020-06-08 NOTE — ANESTHESIA TIME REPORT
Anesthesia Start and Stop Event Times     Date Time Event    6/7/2020 1030 Anesthesia Start     1301 Anesthesia Stop        Responsible Staff  06/07/20    Name Role Begin End    Davis Webb M.D. Anesth 1030 1301        Preop Diagnosis (Free Text):  Pre-op Diagnosis             Preop Diagnosis (Codes):    Post op Diagnosis  Pregnancy      Premium Reason  E. Weekend    Comments:

## 2020-06-08 NOTE — CARE PLAN
Problem: Altered physiologic condition related to immediate post-delivery state and potential for bleeding/hemorrhage  Goal: Patient physiologically stable as evidenced by normal lochia, palpable uterine involution and vital signs within normal limits  Outcome: PROGRESSING AS EXPECTED  Note: Fundus firm at umbilicus with light lochia.      Problem: Potential for postpartum infection related to presence of episiotomy/vaginal tear and/or uterine contamination  Goal: Patient will be absent from signs and symptoms of infection  Outcome: PROGRESSING AS EXPECTED  Note: Vital signs are within acceptable limits.     Problem: Alteration in comfort related to episiotomy, vaginal repair and/or after birth pains  Goal: Patient is able to ambulate, care for self and infant  Outcome: PROGRESSING AS EXPECTED  Note: Ambulating  and voiding without difficulty.      Problem: Potential knowledge deficit related to lack of understanding of self and  care  Goal: Patient will demonstrate ability to care for self and infant  Outcome: PROGRESSING AS EXPECTED  Note: Breast feeding infant with minimal to no assistance.

## 2020-06-08 NOTE — DISCHARGE PLANNING
:    Received a call from MOB stating that the adoptive mother contacted her again.  ALEX contacted [employer] and spoke with Aixa who stated she would call the adoptive mother again.  Met with MOB and provided her information for filing a restraining order.  Discussed that if the adoptive mother continued to contact her that she may want to file a restraining order.  MOB took paperwork and stated she would file if she continued to contact her.      Aixa called back to say that she spoke with the adoptive mother and informed her that she cannot contact MOB any longer.      Plan:  Nothing further.

## 2020-06-08 NOTE — CARE PLAN
Problem: Communication  Goal: The ability to communicate needs accurately and effectively will improve  Outcome: MET     Problem: Safety  Goal: Will remain free from injury  Outcome: MET  Goal: Will remain free from falls  Outcome: MET     Problem: Infection  Goal: Will remain free from infection  Outcome: MET     Problem: Venous Thromboembolism (VTW)/Deep Vein Thrombosis (DVT) Prevention:  Goal: Patient will participate in Venous Thrombosis (VTE)/Deep Vein Thrombosis (DVT)Prevention Measures  Outcome: MET     Problem: Bowel/Gastric:  Goal: Normal bowel function is maintained or improved  Outcome: MET  Goal: Will not experience complications related to bowel motility  Outcome: MET     Problem: Knowledge Deficit  Goal: Knowledge of disease process/condition, treatment plan, diagnostic tests, and medications will improve  Outcome: MET  Goal: Knowledge of the prescribed therapeutic regimen will improve  Outcome: MET     Problem: Discharge Barriers/Planning  Goal: Patient's continuum of care needs will be met  Outcome: MET     Problem: Psychosocial Needs:  Goal: Level of anxiety will decrease  Outcome: MET     Problem: Pain Management  Goal: Pain level will decrease to patient's comfort goal  Outcome: MET     Problem: Fluid Volume:  Goal: Will maintain balanced intake and output  Outcome: MET     Problem: Altered physiologic condition related to immediate post-delivery state and potential for bleeding/hemorrhage  Goal: Patient physiologically stable as evidenced by normal lochia, palpable uterine involution and vital signs within normal limits  Outcome: MET     Problem: Potential for postpartum infection related to presence of episiotomy/vaginal tear and/or uterine contamination  Goal: Patient will be absent from signs and symptoms of infection  Outcome: MET     Problem: Alteration in comfort related to episiotomy, vaginal repair and/or after birth pains  Goal: Patient is able to ambulate, care for self and  infant  Outcome: MET  Goal: Patient verbalizes acceptable pain level  Outcome: MET     Problem: Potential knowledge deficit related to lack of understanding of self and  care  Goal: Patient will verbalize understanding of self and infant care  Outcome: MET  Goal: Patient will demonstrate ability to care for self and infant  Outcome: MET     Problem: Potential anxiety related to difficulty adapting to parental role  Goal: Patient will verbalize and demonstrate effective bonding and parenting behavior  Outcome: MET

## 2020-06-08 NOTE — LACTATION NOTE
This note was copied from a baby's chart.  Mom P6 who delivered baby girl weighing at 39.1 wks. Mom state she breast fed most of her kids for approx 6 months. Her  last baby, who is 3 yrs old, she nursed for only 3 months in combination with bottle feeding.Baby was cared for in the NICU for resp issues. Mother states this baby is feeding very well and she has no concerns or issues. She did not expect nursing to go this well she told the LC.  Mom reports darker and enlarged areolas during pregnancy. Mom denies any breast surgeries, diabetes, thyroid or fertility issues.Discussed observing for early feeding cues and starting a feed at that time, remain skin to skin if no immediate latch and call bedside RN for assist. LC asked to be called for next feeding to observe latch and mother did not respond but answered a phone call from MATILDA and had him waiting while LC was in room doing education. LC then asked mom if she needed her help or anything else dicussed. Mother declined and LC left follow up paper work for [facility] hand expression video, Zoom meeting info,  Medicine phone number and supplemental volumes guidelines. And left the room. Mother aware she may call for assist.

## 2020-06-08 NOTE — ANESTHESIA QCDR
2019 [facility] Clinical Data Registry (for Quality Improvement)     Postoperative nausea/vomiting risk protocol (Adult = 18 yrs and Pediatric 3-17 yrs)- (430 and 463)  General inhalation anesthetic (NOT TIVA) with PONV risk factors:   Provision of anti-emetic therapy with at least 2 different classes of agents:   Patient DID NOT receive anti-emetic therapy and reason is documented in Medical Record:     Multimodal Pain Management- (477)  Non-emergent surgery AND patient age >= 18:   Use of Multimodal Pain Management, two or more drugs and/or interventions, NOT including systemic opioids:   Exception: Documented allergy to multiple classes of analgesics:     Smoking Abstinence (404)  Patient is current smoker (cigarette, pipe, e-cig, marijuanna):   Elective Surgery:   Abstinence instructions provided prior to day of surgery:   Patient abstained from smoking on day of surgery:     Pre-Op Beta-Blocker in Isolated CABG (44)  Isolated CABG AND patient age >= 18:   Beta-blocker admin within 24 hours of surgical incision:   Exception:of medical reason(s) for not administering beta blocker within 24 hours prior to surgical incision (e.g., not  indicated,other medical reason):     PACU assessment of acute postoperative pain prior to Anesthesia Care End- Applies to Patients Age = 18- (ABG7)  Initial PACU pain score is which of the following:   Patient unable to report pain score:     Post-anesthetic transfer of care checklist/protocol to PACU/ICU- (426 and 427)  Upon conclusion of case, patient transferred to which of the following locations:   Use of transfer checklist/protocol:   Exclusion: Service Performed in Patient Hospital Room (and thus did not require transfer):   Unplanned admission to ICU related to anesthesia service up through end of PACU care- (MD51)  Unplanned admission to ICU (not initially anticipated at anesthesia start time): No

## 2020-06-08 NOTE — DISCHARGE PLANNING
:     Met with MOB who has decided to keep baby.  Discussed resources and supplies for infant and MOB stated she has friends that will be helping her with getting a car seat, clothes, and other supplies for baby.  Provided MOB with a children and family resource list, diaper bank referral, and resources for post partum depression.  Support provided to MOB and validated her feelings of wanting to keep infant.  MOB asked that SW contact Adoption agencies: [employer] [facility] [employer].  SW spoke with Ethel at [employer] and Lisa at [employer] to let them know that MOB will be keeping infant.     Later, notified by MOB that the adoptive family and adopt-grandmother have been calling, texting, and reaching out to MOB on Facebook.  MOB would like SW to contact Ethel at [facility] to tell them not to contact her.  Support provided to MOB.  Spoke with Cece at [employer] and she stated they will contact the adoptive family and tell them that they need to stop contacting MOB.  Informed MOB that if she continues to have contact or if they come to her house, then she should notify the police.       No further needs at this time.  Infant is cleared to discharge home with MOB.

## 2020-06-08 NOTE — PROGRESS NOTES
Pt received to room 320. Report received from L&D RN. Pt oriented to room, call light, infant security, emergency light, visiting hours and unit routine. Plan of care discussed encouraged to call with needs.

## 2020-06-08 NOTE — PROGRESS NOTES
Infant placed in car seat by MOB. Cuddles removed. Car seat checked by RN. Infant and MOB discharged in stable condition with RN escort.

## 2020-08-18 ENCOUNTER — HOSPITAL ENCOUNTER (EMERGENCY)
Dept: HOSPITAL 8 - ED | Age: 35
Discharge: HOME | End: 2020-08-18
Payer: MEDICAID

## 2020-08-18 VITALS — HEIGHT: 71 IN | WEIGHT: 293 LBS | BODY MASS INDEX: 41.02 KG/M2

## 2020-08-18 VITALS — SYSTOLIC BLOOD PRESSURE: 133 MMHG | DIASTOLIC BLOOD PRESSURE: 59 MMHG

## 2020-08-18 DIAGNOSIS — L02.01: Primary | ICD-10-CM

## 2020-08-18 PROCEDURE — 10060 I&D ABSCESS SIMPLE/SINGLE: CPT

## 2020-08-18 PROCEDURE — 99284 EMERGENCY DEPT VISIT MOD MDM: CPT

## 2020-08-18 PROCEDURE — 96372 THER/PROPH/DIAG INJ SC/IM: CPT

## 2020-08-18 NOTE — NUR
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 SHE 
HAD A PIMPLE ON HER CHIN, AFTER WORKING ALL NIGHT IT HAS BECOME VERY LARGE AND 
INFLAMED. MOST OF CHIN SWOLLEN, RED, TENDER TO THE TOUCH, AND WEEPING. PT 
DENIES ANY OTHER NEEDS OR CONCERNS. CALL LIGHT IN REACH.

## 2020-08-20 ENCOUNTER — HOSPITAL ENCOUNTER (EMERGENCY)
Dept: HOSPITAL 8 - ED | Age: 35
Discharge: HOME | End: 2020-08-20
Payer: MEDICAID

## 2020-08-20 VITALS — SYSTOLIC BLOOD PRESSURE: 141 MMHG | DIASTOLIC BLOOD PRESSURE: 81 MMHG

## 2020-08-20 VITALS — WEIGHT: 293 LBS | HEIGHT: 71 IN | BODY MASS INDEX: 41.02 KG/M2

## 2020-08-20 DIAGNOSIS — L02.01: Primary | ICD-10-CM

## 2020-08-20 DIAGNOSIS — Z87.891: ICD-10-CM

## 2020-08-20 PROCEDURE — 99283 EMERGENCY DEPT VISIT LOW MDM: CPT

## 2020-08-20 NOTE — NUR
PATIENT WALKED BACK FROM TRIAGE FOR CHIN LACERATION RECHECK.  PATIENT WAS HERE 
2 DAYS AGO FOR ABSCESS ON CHIN AND UNDERWENT AND I&D.  SHE WAS TOLD TO COME 
BACK AND HAVE PACKING REMOVED. ERMD AT BEDSIDE FOR EVALUATION.

## 2020-08-20 NOTE — NUR
Patient given discharge instructions and prescriptions and they have confirmed 
that they understand the instructions. Patient educated on wound care by 
provider and acknowledges understanding. Patient ambulatory with steady gait to 
discharge desk.

## 2020-10-29 ENCOUNTER — NON-PROVIDER VISIT (OUTPATIENT)
Dept: OCCUPATIONAL MEDICINE | Facility: CLINIC | Age: 35
End: 2020-10-29

## 2020-10-29 DIAGNOSIS — Z11.1 ENCOUNTER FOR PPD TEST: Primary | ICD-10-CM

## 2020-10-29 PROCEDURE — 86580 TB INTRADERMAL TEST: CPT | Performed by: NURSE PRACTITIONER

## 2020-11-01 ENCOUNTER — NON-PROVIDER VISIT (OUTPATIENT)
Dept: URGENT CARE | Facility: CLINIC | Age: 35
End: 2020-11-01

## 2020-11-01 LAB — TB WHEAL 3D P 5 TU DIAM: NORMAL MM

## 2020-11-01 NOTE — PROGRESS NOTES
Janice Goins is a 35 y.o. female here for a non-provider visit for PPD reading -- Step 1 of 2.      1.  Resulted in Epic under enter/edit results? Yes   2.  TB evaluation questionnaire scanned into chart and original given to patient?Yes      3. Was induration greater than 0 mm? No.      Routed to PCP? No

## 2020-11-05 ENCOUNTER — NON-PROVIDER VISIT (OUTPATIENT)
Dept: OCCUPATIONAL MEDICINE | Facility: CLINIC | Age: 35
End: 2020-11-05

## 2020-11-05 DIAGNOSIS — Z11.1 ENCOUNTER FOR PPD TEST: Primary | ICD-10-CM

## 2020-11-05 PROCEDURE — 86580 TB INTRADERMAL TEST: CPT | Performed by: NURSE PRACTITIONER

## 2021-08-02 ENCOUNTER — APPOINTMENT (OUTPATIENT)
Dept: TELEHEALTH | Facility: TELEMEDICINE | Age: 36
End: 2021-08-02
Payer: MEDICAID

## (undated) DEVICE — ELECTRODE 850 FOAM ADHESIVE - HYDROGEL RADIOTRNSPRNT (50/PK)

## (undated) DEVICE — SET LEADWIRE 5 LEAD BEDSIDE DISPOSABLE ECG (1SET OF 5/EA)

## (undated) DEVICE — SODIUM CHL IRRIGATION 0.9% 1000ML (12EA/CA)

## (undated) DEVICE — NEEDLE NON SAFETY HYPO 22 GA X 1 1/2 IN (100/BX)

## (undated) DEVICE — SET EXTENSION WITH 2 PORTS (48EA/CA) ***PART #2C8610 IS A SUBSTITUTE*****

## (undated) DEVICE — SENSOR SPO2 NEO LNCS ADHESIVE (20/BX) SEE USER NOTES

## (undated) DEVICE — PAD LAP STERILE 18 X 18 - (5/PK 40PK/CA)

## (undated) DEVICE — SPLINT PLASTER 5 IN X 30 IN - (50EA/BX 6BX/CA)

## (undated) DEVICE — NEPTUNE 4 PORT MANIFOLD - (20/PK)

## (undated) DEVICE — BANDAGE ROLL STERILE BULKEE 4.5 IN X 4 YD (100EA/CA)

## (undated) DEVICE — ELECTRODE DUAL RETURN W/ CORD - (50/PK)

## (undated) DEVICE — DRAPE U ORTHOPEDIC - (10/BX)

## (undated) DEVICE — MASK ANESTHESIA ADULT  - (100/CA)

## (undated) DEVICE — KIT ROOM DECONTAMINATION

## (undated) DEVICE — PACK LOWER EXTREMITY - (2/CA)

## (undated) DEVICE — GOWN WARMING STANDARD FLEX - (30/CA)

## (undated) DEVICE — SUTURE 3-0 ETHILON FS-1 - (36/BX) 30 INCH

## (undated) DEVICE — DRILL BIT 2.5 TWIST 310.25 (8TX2+1TX3=19)

## (undated) DEVICE — BOVIE BLADE COATED - (50/PK)

## (undated) DEVICE — HEAD HOLDER JUNIOR/ADULT

## (undated) DEVICE — WRAP COBAN SELF-ADHERENT 6 IN X  5YDS STERILE TAN (12/CA)

## (undated) DEVICE — COTTON ROLL 1 POUND BIOSEAL - (5RL/CA)

## (undated) DEVICE — SPONGE GAUZESTER 4 X 4 4PLY - (128PK/CA)

## (undated) DEVICE — PAD PREP 24 X 48 CUFFED - (100/CA)

## (undated) DEVICE — GLOVE BIOGEL INDICATOR SZ 7.5 SURGICAL PF LTX - (50PR/BX 4BX/CA)

## (undated) DEVICE — KIT ANESTHESIA W/CIRCUIT & 3/LT BAG W/FILTER (20EA/CA)

## (undated) DEVICE — GLOVE SZ 7 BIOGEL PI MICRO - PF LF (50PR/BX 4BX/CA)

## (undated) DEVICE — PROTECTOR ULNA NERVE - (36PR/CA)

## (undated) DEVICE — DRESSING XEROFORM 1X8 - (50/BX 4BX/CA)

## (undated) DEVICE — PADDING CAST 6 IN STERILE - 6 X 4 YDS (24/CA)

## (undated) DEVICE — DRAPE C-ARM LARGE 41IN X 74 IN - (10/BX 2BX/CA)

## (undated) DEVICE — SUCTION INSTRUMENT YANKAUER BULBOUS TIP W/O VENT (50EA/CA)

## (undated) DEVICE — PENCIL ELECTSURG 10FT BTN SWH - (50/CA)

## (undated) DEVICE — GLOVE BIOGEL PI INDICATOR SZ 7.5 SURGICAL PF LF -(50/BX 4BX/CA)

## (undated) DEVICE — LACTATED RINGERS INJ 1000 ML - (14EA/CA 60CA/PF)

## (undated) DEVICE — DRAPE 36X28IN RAD CARM BND BG - (25/CA) O

## (undated) DEVICE — SUTURE 2-0 VICRYL PLUS CT-1 36 (36PK/BX)"

## (undated) DEVICE — SUTURE 0 VICRYL PLUS CT-1 - 36 INCH (36/BX)

## (undated) DEVICE — BANDAGE ELASTIC 6 HONEYCOMB - 6X5YD LF (20/CA)"

## (undated) DEVICE — DRILL BIT 2.0 LONG 310.21 - (6TX212) SDSTB=2

## (undated) DEVICE — CHLORAPREP 26 ML APPLICATOR - ORANGE TINT(25/CA)

## (undated) DEVICE — GLOVE BIOGEL PI ORTHO SZ 7 PF LF (40PR/BX)

## (undated) DEVICE — DRAPE C ARMOR (12EA/CA)

## (undated) DEVICE — TOWELS CLOTH SURGICAL - (4/PK 20PK/CA)

## (undated) DEVICE — BANDAGE ELASTIC STERILE MATRIX 6 X 10 (20EA/CA)

## (undated) DEVICE — GLOVE BIOGEL SZ 7.5 SURGICAL PF LTX - (50PR/BX 4BX/CA)

## (undated) DEVICE — GLOVE SZ 6.5 BIOGEL PI MICRO - PF LF (50PR/BX)

## (undated) DEVICE — DRAPE LARGE 3 QUARTER - (20/CA)

## (undated) DEVICE — GLOVE BIOGEL PI INDICATOR SZ 7.0 SURGICAL PF LF - (50/BX 4BX/CA)

## (undated) DEVICE — SUTURE GENERAL

## (undated) DEVICE — DRAPE SURG STERI-DRAPE 7X11OD - (40EA/CA)

## (undated) DEVICE — STAPLER SKIN DISP - (6/BX 10BX/CA) VISISTAT

## (undated) DEVICE — DETERGENT RENUZYME PLUS 10 OZ PACKET (50/BX)

## (undated) DEVICE — CANISTER SUCTION 3000ML MECHANICAL FILTER AUTO SHUTOFF MEDI-VAC NONSTERILE LF DISP  (40EA/CA)

## (undated) DEVICE — DRILL BIT 2.7 125MM 315.28 (5TX2+1TX1=11)

## (undated) DEVICE — TUBING CLEARLINK DUO-VENT - C-FLO (48EA/CA)

## (undated) DEVICE — BLADE SURGICAL #15 - (50/BX 3BX/CA)

## (undated) DEVICE — SYRINGE 30 ML LL (56/BX)